# Patient Record
Sex: MALE | Race: ASIAN | NOT HISPANIC OR LATINO | Employment: OTHER | URBAN - METROPOLITAN AREA
[De-identification: names, ages, dates, MRNs, and addresses within clinical notes are randomized per-mention and may not be internally consistent; named-entity substitution may affect disease eponyms.]

---

## 2017-02-04 ENCOUNTER — APPOINTMENT (OUTPATIENT)
Dept: LAB | Age: 67
End: 2017-02-04
Payer: MEDICARE

## 2017-02-04 ENCOUNTER — TRANSCRIBE ORDERS (OUTPATIENT)
Dept: ADMINISTRATIVE | Age: 67
End: 2017-02-04

## 2017-02-04 DIAGNOSIS — Z12.5 ENCOUNTER FOR SCREENING FOR MALIGNANT NEOPLASM OF PROSTATE: ICD-10-CM

## 2017-02-04 DIAGNOSIS — E78.00 PURE HYPERCHOLESTEROLEMIA: ICD-10-CM

## 2017-02-04 DIAGNOSIS — I10 ESSENTIAL (PRIMARY) HYPERTENSION: ICD-10-CM

## 2017-02-04 DIAGNOSIS — D69.6 THROMBOCYTOPENIA (HCC): ICD-10-CM

## 2017-02-04 LAB
ALBUMIN SERPL BCP-MCNC: 4.4 G/DL (ref 3.5–5)
ALP SERPL-CCNC: 64 U/L (ref 46–116)
ALT SERPL W P-5'-P-CCNC: 39 U/L (ref 12–78)
ANION GAP SERPL CALCULATED.3IONS-SCNC: 5 MMOL/L (ref 4–13)
AST SERPL W P-5'-P-CCNC: 21 U/L (ref 5–45)
BILIRUB SERPL-MCNC: 1.11 MG/DL (ref 0.2–1)
BILIRUB UR QL STRIP: NEGATIVE
BUN SERPL-MCNC: 12 MG/DL (ref 5–25)
CALCIUM SERPL-MCNC: 9.1 MG/DL (ref 8.3–10.1)
CHLORIDE SERPL-SCNC: 102 MMOL/L (ref 100–108)
CHOLEST SERPL-MCNC: 125 MG/DL (ref 50–200)
CLARITY UR: CLEAR
CO2 SERPL-SCNC: 35 MMOL/L (ref 21–32)
COLOR UR: YELLOW
CREAT SERPL-MCNC: 1 MG/DL (ref 0.6–1.3)
ERYTHROCYTE [DISTWIDTH] IN BLOOD BY AUTOMATED COUNT: 13 % (ref 11.6–15.1)
GFR SERPL CREATININE-BSD FRML MDRD: >60 ML/MIN/1.73SQ M
GLUCOSE SERPL-MCNC: 92 MG/DL (ref 65–140)
GLUCOSE UR STRIP-MCNC: NEGATIVE MG/DL
HCT VFR BLD AUTO: 47.5 % (ref 36.5–49.3)
HDLC SERPL-MCNC: 40 MG/DL (ref 40–60)
HGB BLD-MCNC: 16.1 G/DL (ref 12–17)
HGB UR QL STRIP.AUTO: NEGATIVE
KETONES UR STRIP-MCNC: NEGATIVE MG/DL
LDLC SERPL CALC-MCNC: 56 MG/DL (ref 0–100)
LEUKOCYTE ESTERASE UR QL STRIP: NEGATIVE
MCH RBC QN AUTO: 29.3 PG (ref 26.8–34.3)
MCHC RBC AUTO-ENTMCNC: 33.9 G/DL (ref 31.4–37.4)
MCV RBC AUTO: 86 FL (ref 82–98)
NITRITE UR QL STRIP: NEGATIVE
PH UR STRIP.AUTO: 6.5 [PH] (ref 4.5–8)
PLATELET # BLD AUTO: 136 THOUSANDS/UL (ref 149–390)
PMV BLD AUTO: 12.1 FL (ref 8.9–12.7)
POTASSIUM SERPL-SCNC: 3.4 MMOL/L (ref 3.5–5.3)
PROT SERPL-MCNC: 8.4 G/DL (ref 6.4–8.2)
PROT UR STRIP-MCNC: NEGATIVE MG/DL
PSA SERPL-MCNC: 1.8 NG/ML (ref 0–4)
RBC # BLD AUTO: 5.5 MILLION/UL (ref 3.88–5.62)
SODIUM SERPL-SCNC: 142 MMOL/L (ref 136–145)
SP GR UR STRIP.AUTO: 1.01 (ref 1–1.03)
T3 SERPL-MCNC: 1 NG/ML (ref 0.6–1.8)
T4 FREE SERPL-MCNC: 1.04 NG/DL (ref 0.76–1.46)
TRIGL SERPL-MCNC: 145 MG/DL
TSH SERPL DL<=0.05 MIU/L-ACNC: 3.29 UIU/ML (ref 0.36–3.74)
UROBILINOGEN UR QL STRIP.AUTO: 0.2 E.U./DL
WBC # BLD AUTO: 5.37 THOUSAND/UL (ref 4.31–10.16)

## 2017-02-04 PROCEDURE — 81003 URINALYSIS AUTO W/O SCOPE: CPT

## 2017-02-04 PROCEDURE — 85027 COMPLETE CBC AUTOMATED: CPT

## 2017-02-04 PROCEDURE — 80053 COMPREHEN METABOLIC PANEL: CPT

## 2017-02-04 PROCEDURE — 80061 LIPID PANEL: CPT

## 2017-02-04 PROCEDURE — 84443 ASSAY THYROID STIM HORMONE: CPT

## 2017-02-04 PROCEDURE — 36415 COLL VENOUS BLD VENIPUNCTURE: CPT

## 2017-02-04 PROCEDURE — 84480 ASSAY TRIIODOTHYRONINE (T3): CPT

## 2017-02-04 PROCEDURE — G0103 PSA SCREENING: HCPCS

## 2017-02-04 PROCEDURE — 84439 ASSAY OF FREE THYROXINE: CPT

## 2017-02-15 ENCOUNTER — ALLSCRIPTS OFFICE VISIT (OUTPATIENT)
Dept: OTHER | Facility: OTHER | Age: 67
End: 2017-02-15

## 2017-04-17 DIAGNOSIS — E87.6 HYPOKALEMIA: ICD-10-CM

## 2017-04-17 DIAGNOSIS — I10 ESSENTIAL (PRIMARY) HYPERTENSION: ICD-10-CM

## 2017-04-22 ENCOUNTER — TRANSCRIBE ORDERS (OUTPATIENT)
Dept: ADMINISTRATIVE | Age: 67
End: 2017-04-22

## 2017-04-22 ENCOUNTER — APPOINTMENT (OUTPATIENT)
Dept: LAB | Age: 67
End: 2017-04-22
Payer: MEDICARE

## 2017-04-22 DIAGNOSIS — I10 ESSENTIAL (PRIMARY) HYPERTENSION: ICD-10-CM

## 2017-04-22 DIAGNOSIS — E87.6 HYPOKALEMIA: ICD-10-CM

## 2017-04-22 LAB
ANION GAP SERPL CALCULATED.3IONS-SCNC: 3 MMOL/L (ref 4–13)
BUN SERPL-MCNC: 11 MG/DL (ref 5–25)
CALCIUM SERPL-MCNC: 9.4 MG/DL (ref 8.3–10.1)
CHLORIDE SERPL-SCNC: 105 MMOL/L (ref 100–108)
CO2 SERPL-SCNC: 33 MMOL/L (ref 21–32)
CREAT SERPL-MCNC: 0.87 MG/DL (ref 0.6–1.3)
GFR SERPL CREATININE-BSD FRML MDRD: >60 ML/MIN/1.73SQ M
GLUCOSE P FAST SERPL-MCNC: 96 MG/DL (ref 65–99)
POTASSIUM SERPL-SCNC: 4.3 MMOL/L (ref 3.5–5.3)
SODIUM SERPL-SCNC: 141 MMOL/L (ref 136–145)

## 2017-04-22 PROCEDURE — 36415 COLL VENOUS BLD VENIPUNCTURE: CPT

## 2017-04-22 PROCEDURE — 80048 BASIC METABOLIC PNL TOTAL CA: CPT

## 2017-06-08 ENCOUNTER — ALLSCRIPTS OFFICE VISIT (OUTPATIENT)
Dept: OTHER | Facility: OTHER | Age: 67
End: 2017-06-08

## 2017-06-08 DIAGNOSIS — E78.00 PURE HYPERCHOLESTEROLEMIA: ICD-10-CM

## 2017-06-08 DIAGNOSIS — E87.6 HYPOKALEMIA: ICD-10-CM

## 2017-06-08 DIAGNOSIS — I10 ESSENTIAL (PRIMARY) HYPERTENSION: ICD-10-CM

## 2017-06-08 DIAGNOSIS — D69.6 THROMBOCYTOPENIA (HCC): ICD-10-CM

## 2017-06-08 DIAGNOSIS — R35.1 NOCTURIA: ICD-10-CM

## 2017-06-08 DIAGNOSIS — Z12.5 ENCOUNTER FOR SCREENING FOR MALIGNANT NEOPLASM OF PROSTATE: ICD-10-CM

## 2017-11-24 ENCOUNTER — ALLSCRIPTS OFFICE VISIT (OUTPATIENT)
Dept: OTHER | Facility: OTHER | Age: 67
End: 2017-11-24

## 2017-11-24 ENCOUNTER — GENERIC CONVERSION - ENCOUNTER (OUTPATIENT)
Dept: OTHER | Facility: OTHER | Age: 67
End: 2017-11-24

## 2017-11-24 DIAGNOSIS — Z00.00 ENCOUNTER FOR GENERAL ADULT MEDICAL EXAMINATION WITHOUT ABNORMAL FINDINGS: ICD-10-CM

## 2017-12-03 ENCOUNTER — TRANSCRIBE ORDERS (OUTPATIENT)
Dept: ADMINISTRATIVE | Age: 67
End: 2017-12-03

## 2017-12-03 ENCOUNTER — APPOINTMENT (OUTPATIENT)
Dept: LAB | Age: 67
End: 2017-12-03
Payer: MEDICARE

## 2017-12-03 DIAGNOSIS — R35.1 NOCTURIA: ICD-10-CM

## 2017-12-03 DIAGNOSIS — D69.6 ACQUIRED THROMBOCYTOPENIA (HCC): ICD-10-CM

## 2017-12-03 DIAGNOSIS — E78.00 PURE HYPERCHOLESTEROLEMIA: ICD-10-CM

## 2017-12-03 DIAGNOSIS — I10 ESSENTIAL HYPERTENSION, MALIGNANT: ICD-10-CM

## 2017-12-03 DIAGNOSIS — E87.6 HYPOPOTASSEMIA: ICD-10-CM

## 2017-12-03 DIAGNOSIS — I10 ESSENTIAL HYPERTENSION, MALIGNANT: Primary | ICD-10-CM

## 2017-12-03 LAB
ALBUMIN SERPL BCP-MCNC: 4.4 G/DL (ref 3.5–5)
ALP SERPL-CCNC: 87 U/L (ref 46–116)
ALT SERPL W P-5'-P-CCNC: 35 U/L (ref 12–78)
ANION GAP SERPL CALCULATED.3IONS-SCNC: 5 MMOL/L (ref 4–13)
AST SERPL W P-5'-P-CCNC: 22 U/L (ref 5–45)
BILIRUB SERPL-MCNC: 1.17 MG/DL (ref 0.2–1)
BUN SERPL-MCNC: 13 MG/DL (ref 5–25)
CALCIUM SERPL-MCNC: 9.4 MG/DL (ref 8.3–10.1)
CHLORIDE SERPL-SCNC: 106 MMOL/L (ref 100–108)
CHOLEST SERPL-MCNC: 133 MG/DL (ref 50–200)
CO2 SERPL-SCNC: 31 MMOL/L (ref 21–32)
CREAT SERPL-MCNC: 1.03 MG/DL (ref 0.6–1.3)
ERYTHROCYTE [DISTWIDTH] IN BLOOD BY AUTOMATED COUNT: 13 % (ref 11.6–15.1)
GFR SERPL CREATININE-BSD FRML MDRD: 76 ML/MIN/1.73SQ M
GLUCOSE P FAST SERPL-MCNC: 94 MG/DL (ref 65–99)
HCT VFR BLD AUTO: 46.8 % (ref 36.5–49.3)
HDLC SERPL-MCNC: 44 MG/DL (ref 40–60)
HGB BLD-MCNC: 16 G/DL (ref 12–17)
LDLC SERPL CALC-MCNC: 69 MG/DL (ref 0–100)
MCH RBC QN AUTO: 29.9 PG (ref 26.8–34.3)
MCHC RBC AUTO-ENTMCNC: 34.2 G/DL (ref 31.4–37.4)
MCV RBC AUTO: 87 FL (ref 82–98)
PLATELET # BLD AUTO: 133 THOUSANDS/UL (ref 149–390)
PMV BLD AUTO: 11.1 FL (ref 8.9–12.7)
POTASSIUM SERPL-SCNC: 4.1 MMOL/L (ref 3.5–5.3)
PROT SERPL-MCNC: 8.7 G/DL (ref 6.4–8.2)
PSA SERPL-MCNC: 2.3 NG/ML (ref 0–4)
RBC # BLD AUTO: 5.36 MILLION/UL (ref 3.88–5.62)
SODIUM SERPL-SCNC: 142 MMOL/L (ref 136–145)
TRIGL SERPL-MCNC: 98 MG/DL
WBC # BLD AUTO: 6.12 THOUSAND/UL (ref 4.31–10.16)

## 2017-12-03 PROCEDURE — 80061 LIPID PANEL: CPT

## 2017-12-03 PROCEDURE — 36415 COLL VENOUS BLD VENIPUNCTURE: CPT

## 2017-12-03 PROCEDURE — G0103 PSA SCREENING: HCPCS

## 2017-12-03 PROCEDURE — 80053 COMPREHEN METABOLIC PANEL: CPT

## 2017-12-03 PROCEDURE — 85027 COMPLETE CBC AUTOMATED: CPT

## 2017-12-07 ENCOUNTER — APPOINTMENT (OUTPATIENT)
Dept: LAB | Facility: CLINIC | Age: 67
End: 2017-12-07
Payer: MEDICARE

## 2017-12-07 DIAGNOSIS — Z00.00 ENCOUNTER FOR GENERAL ADULT MEDICAL EXAMINATION WITHOUT ABNORMAL FINDINGS: ICD-10-CM

## 2017-12-07 LAB — HEMOCCULT STL QL IA: NEGATIVE

## 2017-12-07 PROCEDURE — G0328 FECAL BLOOD SCRN IMMUNOASSAY: HCPCS

## 2017-12-13 ENCOUNTER — GENERIC CONVERSION - ENCOUNTER (OUTPATIENT)
Dept: OTHER | Facility: OTHER | Age: 67
End: 2017-12-13

## 2018-01-12 VITALS
DIASTOLIC BLOOD PRESSURE: 86 MMHG | BODY MASS INDEX: 26.35 KG/M2 | HEART RATE: 61 BPM | OXYGEN SATURATION: 98 % | TEMPERATURE: 98.1 F | WEIGHT: 158.13 LBS | SYSTOLIC BLOOD PRESSURE: 130 MMHG | HEIGHT: 65 IN

## 2018-01-13 VITALS
DIASTOLIC BLOOD PRESSURE: 78 MMHG | HEIGHT: 64 IN | WEIGHT: 155 LBS | TEMPERATURE: 97.8 F | HEART RATE: 62 BPM | SYSTOLIC BLOOD PRESSURE: 136 MMHG | BODY MASS INDEX: 26.46 KG/M2 | OXYGEN SATURATION: 97 %

## 2018-01-14 VITALS
HEART RATE: 67 BPM | SYSTOLIC BLOOD PRESSURE: 130 MMHG | OXYGEN SATURATION: 95 % | BODY MASS INDEX: 26.39 KG/M2 | DIASTOLIC BLOOD PRESSURE: 78 MMHG | WEIGHT: 158.38 LBS | TEMPERATURE: 98.3 F | HEIGHT: 65 IN

## 2018-01-23 NOTE — PROGRESS NOTES
Assessment   1  Benign essential hypertension (401 1) (I10)  2  Hypercholesterolemia (272 0) (E78 00)  3  Thrombocytopenia (287 5) (D69 6)  4  Nocturia (788 43) (R35 1)  5  Encounter for preventive health examination (V70 0) (Z00 00)    Plan  Health Maintenance    · (1) OCCULT BLOOD, FECAL IMMUNOCHEMICAL TEST; Status:Active; Requested  for:24Nov2017;    · EKG/ECG- POC; Status:Complete - Retrospective By Protocol Authorization;   Done:  79DQD1798 11:29AM   · Medicare Annual Wellness Visit; Status:Complete - Retrospective By Protocol  Authorization;   Done: 39CWL9211 11:17AM   · SNELLEN VISION- POC; Status:Complete - Retrospective By Protocol Authorization;    Done: 01RWX3428 11:30AM   · Medicare Annual Wellness Visit ; every 1 year; Last 32MHT0053; Next 43ZXA7808;  Status:Active  Negative depression screening, Screening for depression    · *VB-Depression Screening; Status:Complete - Retrospective By Protocol Authorization;    Done: 52DGB8404 11:19AM   · *VB-Depression Screening ; every 1 year; Last 31IIB2176; Next 67GZD5090;  Status:Active   · PHQ-2 Adult Depression Screening ; every 1 year; Last 51FNY8243; Next 90PFU5189;  Status:Active  PMH: Screening for genitourinary condition    · *VB - Urinary Incontinence Screen (Dx Z13 89 Screen for UI) ; every 1 year; Last  15QGX6226; Next 74VEG7625; Status:Active   · Falls Risk Assessment (Dx Z13 89 Screen for Neurologic Disorder) ; every 1 year; Last  85HUQ2464; Next 75QCZ9111; Status:Active  Screening for genitourinary condition    · *VB - Urinary Incontinence Screen (Dx Z13 89 Screen for UI); Status:Complete -  Retrospective By Protocol Authorization;   Done: 19VOG6639 11:18AM  Screening for neurological condition    · *VB - Fall Risk Assessment  (Dx Z13 89 Screen for Neurologic Disorder);  Status:Complete - Retrospective Authorization;   Done: 57PUH7785 11:17AM   · *VB - Fall Risk Assessment  (Dx Z13 89 Screen for Neurologic Disorder) ; every 1 year;   Last 76NTO1741; Next [id number]; Status:Active    Discussion/Summary    No changes were made to current medications  Screening blood work and fecal immunochemical testing scheduled for early December  We will follow up with appropriate assessments at that time  Chief Complaint  PT IS BEING SEEN FOR HIS WELCOME TO MEDICARE WELCOME VISIT  History of Present Illness  Welcome to Medicare and Wellness Visits: The patient is being seen for the welcome to medicare visit  Medicare Screening and Risk Factors   Hospitalizations: no previous hospitalizations  Once per lifetime medicare screening tests: ECG and AAA screening US has not yet been done  Medicare Screening Tests Risk Questions   Abdominal aortic aneurysm risk assessment: over 72years of age, but none indicated  Osteoporosis risk assessment:  descent, over 48years of age and alcohol use  HIV risk assessment: none indicated  Drug and Alcohol Use: The patient has never smoked cigarettes and has never used smokeless tobacco  The patient reports occasional alcohol use  Alcohol concern:   The patient has no concerns about alcohol abuse  He has never used illicit drugs  Diet and Physical Activity: Current diet includes well balanced meals, limited junk food, 1 servings of fruit per day, 1 servings of vegetables per day, 1 servings of meat per day, 1 servings of whole grains per day, 1 servings of simple carbohydrates per day, 1 servings of dairy products per day, 1 cups of coffee per day and 1 cups of tea per day  He exercises daily and exercises 5 times per week  Exercise: walking, stretching 60 minutes per day  Mood Disorder and Cognitive Impairment Screening:   Depression screening  negative for symptoms  He denies feeling down, depressed, or hopeless over the past two weeks  He denies feeling little interest or pleasure in doing things over the past two weeks     Cognitive impairment screening: denies difficulty learning/retaining new information, denies difficulty handling complex tasks, denies difficulty with reasoning, denies difficulty with spatial ability and orientation, denies difficulty with language and denies difficulty with behavior  Functional Ability/Level of Safety: Hearing is normal bilaterally, normal in the right ear and normal in the left ear  He denies hearing difficulties  He does not use a hearing aid  The patient is currently able to do activities of daily living with limitations, able to do instrumental activities of daily living with limitations and able to participate in social activities with limitations, but able to drive without limitations  Activities of daily living details: does not need help using the phone, no transportation help needed, does not need help shopping, no meal preparation help needed, does not need help doing housework, does not need help doing laundry, does not need help managing medications and does not need help managing money  Fall risk factors: The patient fell 0 times in the past 12 months  Injury History: polypharmacy, alcohol use, no mobility impairment, no antidepressant use, no deconditioning, no postural hypotension, no sedative use, no visual impairment, no urinary incontinence, antihypertensive use, no cognitive impairment, up and go test was normal and no previous fall  Home safety risk factors:  no grab bars in the bathroom, but no unfamiliar surroundings, no loose rugs, no poor household lighting, no uneven floors and no household clutter  Advance Directives: Advance directives: living will, but no durable power of  for health care directives and no advance directives  Co-Managers and Medical Equipment/Suppliers: See Patient Care Team   Preventive Quality Program 65 and Older: Falls Risk: The patient fell 0 times in the past 12 months  The patient currently has no urinary incontinence symptoms         Patient Care Team    Care Team Member Role Specialty Office Number   [facility], Kyle HIGGINS  Gastroenterology Adult [phone]   Kulwinder Caceres MD  Internal Medicine [phone]     Review of Systems    Constitutional: negative  Head and Face: negative  Eyes: negative  ENT: negative  Cardiovascular: negative  Respiratory: negative  Gastrointestinal: negative  Genitourinary: negative    nocturia  Musculoskeletal: negative  Integumentary and Breasts: negative  Neurological: negative  Psychiatric: negative  Endocrine: negative  Hematologic and Lymphatic: negative  Over the past 2 weeks, how often have you been bothered by the following problems? 1 ) Little interest or pleasure in doing things? Not at all    2 ) Feeling down, depressed or hopeless? Not at all    3 ) Trouble falling asleep or sleeping too much? Not at all    4 ) Feeling tired or having little energy? Not at all    5 ) Poor appetite or overeating? Not at all    6 ) Feeling bad about yourself, or that you are a failure, or have let yourself or your family down? Not at all    7 ) Trouble concentrating on things, such as reading a newspaper or watching television? Not at all    8 ) Moving or speaking so slowly that other people could have noticed, or the opposite, moving or speaking faster than usual? Not at all    9 ) Thoughts that you would be better off dead or of hurting yourself in some way? Not at all  Score 0      Active Problems   1  Benign essential hypertension (401 1) (I10)  2  Hypercholesterolemia (272 0) (E78 00)  3  Hypokalemia (276 8) (E87 6)  4  Nocturia (788 43) (R35 1)  5  Thrombocytopenia (287 5) (D69 6)    Past Medical History    · History of hypertension (V12 59) (Z86 79)   · History of urinary frequency (V13 09) (J15 291)    The active problems and past medical history were reviewed and updated today  Surgical History    · History of Hernia Repair    The surgical history was reviewed and updated today         Family History  Mother    · Family history of Benign essential HTN   · Denied: Family history of Colon cancer   · Denied: Family history of Crohn disease   · Denied: Family history of Liver disease  Father    · Denied: Family history of Colon cancer   · Denied: Family history of Crohn disease   · Denied: Family history of Liver disease    The family history was reviewed and updated today  Social History    · Never a smoker   · No alcohol use   · No illicit drug use  The social history was reviewed and updated today  The social history was reviewed and is unchanged  Current Meds  1  AmLODIPine Besylate 5 MG Oral Tablet; Take 1 tablet daily  Requested for: 77AAE8355;   Last Rx:08Jun2017 Ordered  2  Metoprolol Succinate ER 25 MG Oral Tablet Extended Release 24 Hour; Take 1 tablet   daily; Therapy: 21TMC4882 to (Last Rx:08Jun2017)  Requested for: 67OIC5734 Ordered  3  Metoprolol-Hydrochlorothiazide 50-25 MG Oral Tablet; TAKE 1 TABLET DAILY; Therapy: 98PLM0611 to (Evaluate:14Jan2017)  Requested for: 64ZPN2905; Last   Rx:84Skj5358; Status: ACTIVE - Renewal Voided Ordered  4  Simvastatin 20 MG Oral Tablet; Take 1 tablet daily  Requested for: 13OKK6360; Last   Rx:08Jun2017 Ordered  5  Terazosin HCl - 2 MG Oral Capsule; TAKE 1 CAPSULE Bedtime; Therapy: 62XYU4339 to (Evaluate:39Nbq5231)  Requested for: 53NYP5173; Last   Rx:18Naf7625 Ordered    The medication list was reviewed and updated today  Allergies   1  No Known Drug Allergies   2  No Known Environmental Allergies  3  No Known Food Allergies    Immunizations   1 2 3 4    Influenza  15-Oct-2014 15-Oct-2015 27-Oct-2016 11-Oct-2017     Vitals  Signs    Temperature: 97 8 F, Oral  Heart Rate: 62  Systolic: 124, LUE, Sitting  Diastolic: 78, LUE, Sitting  Height: 5 ft 3 5 in  Weight: 155 lb   BMI Calculated: 27 03  BSA Calculated: 1 75  O2 Saturation: 97    Physical Exam    Constitutional   General appearance: No acute distress, well appearing and well nourished      Head and Face   Head and face: Normal  Palpation of the face and sinuses: No sinus tenderness  Eyes   Conjunctiva and lids: No erythema, swelling or discharge  Pupils and irises: Equal, round, reactive to light  Ears, Nose, Mouth, and Throat   External inspection of ears and nose: Normal     Otoscopic examination: Tympanic membranes translucent with normal light reflex  Canals patent without erythema  Nasal mucosa, septum, and turbinates: Normal without edema or erythema  Lips, teeth, and gums: Normal, good dentition  Oropharynx: Normal with no erythema, edema, exudate or lesions  Neck   Neck: Supple, symmetric, trachea midline, no masses  Thyroid: Normal, no thyromegaly  Pulmonary   Respiratory effort: No increased work of breathing or signs of respiratory distress  Percussion of chest: Normal     Palpation of chest: Normal     Auscultation of lungs: Clear to auscultation  Cardiovascular   Palpation of heart: Normal PMI, no thrills  Auscultation of heart: Normal rate and rhythm, normal S1 and S2, no murmurs  Carotid pulses: 2+ bilaterally  Abdominal aorta: Normal     Femoral pulses: 2+ bilaterally  Pedal pulses: 2+ bilaterally  Peripheral vascular exam: Normal     Examination of extremities for edema and/or varicosities: Normal     Chest   Breasts: Normal, no dimpling or skin changes appreciated  Palpation of breasts and axillae: Normal, no masses palpated  Chest: Normal     Abdomen   Abdomen: Non-tender, no masses  Liver and spleen: No hepatomegaly or splenomegaly  Examination for hernias: No hernias appreciated  Anus, perineum, and rectum: Normal sphincter tone, no masses, no prolapse  Ordered FIT  Genitourinary   Scrotal contents: Normal testes, no masses  Penis: Normal, no lesions  Digital rectal exam of prostate: Normal size, no masses  Lymphatic   Palpation of lymph nodes in neck: No lymphadenopathy  Palpation of lymph nodes in axillae: No lymphadenopathy      Palpation of lymph nodes in groin: No lymphadenopathy  Palpation of lymph nodes in other areas: No lymphadenopathy  Musculoskeletal   Gait and station: Normal     Inspection/palpation of digits and nails: Normal without clubbing or cyanosis  Inspection/palpation of joints, bones, and muscles: Normal     Range of motion: Normal     Stability: Normal     Muscle strength/tone: Normal     Skin   Skin and subcutaneous tissue: Normal without rashes or lesions  Palpation of skin and subcutaneous tissue: Normal turgor  Neurologic   Cranial nerves: Cranial nerves 2-12 intact  Reflexes: 2+ and symmetric  Sensation: No sensory loss  Psychiatric   Orientation to person, place and time: Normal     Mood and affect: Normal        Results/Data  SNELLEN VISION- POC 03MYB9266 11:30AM Norlin Hernandez     Test Name Result Flag Reference   Right Eye 20/20     Left Eye 20/13       EKG/ECG- POC 14HDN0890 11:29AM Norlin Hernandez     Test Name Result Flag Reference   EKG/ECG 11/24/2017       *VB-Depression Screening 62SCV9593 11:19AM Norlin Hernandez     Test Name Result Flag Reference   Depression Scale Result      Depression Screen - Negative For Symptoms     *VB - Urinary Incontinence Screen (Dx Z13 89 Screen for UI) 99IWF6755 11:18AM Norlin Hernandez     Test Name Result Flag Reference   Urinary Incontinence Assessment 13VKG1842       Medicare Annual Wellness Visit 80MXT8733 11:17AM Norlin Hernandez     Test Name Result Flag Reference   MEDICARE [place] VISIT [id number]       *VB - Fall Risk Assessment  (Dx Z13 89 Screen for Neurologic Disorder) 03AXE9856 11:17AM Norlin Hernandez     Test Name Result Flag Reference   Falls Risk      No falls in the past year     A 12 lead ECG was performed and was normal  No acute ischemia  Rhythm and rate: the atrial rate is 53 beats per minute and ventricular rate is 53 beats per minute, but normal sinus rhythm     P-waves: NE interval is normal, but the P wave is normal  ND Interval: 188 seconds  Axis: the QRS axis is normal    QRS: QRS interval: 96 seconds, but the QRS is normal    ST segment: QTc interval: 418 and the ST segments are normal    T waves: normal    Comparison to prior ECGs:  no prior ECGs were available for comparison  Procedure    Procedure:   Inforrmation supplied by a Snellen chart  Results: 20/20 in the right eye with corrective device, 20/13 in the left eye with corrective device      Health Management  History of Colon cancer screening   COLONOSCOPY; every 10 years; Last 11COG9271; Next Due: 90CBO0489; Active  History of Screening for genitourinary condition   *VB - Urinary Incontinence Screen (Dx Z13 89 Screen for UI); every 1 year; Last  34DMC1468; Next Due: 35ASH5774; Active  Falls Risk Assessment (Dx Z13 89 Screen for Neurologic Disorder); every 1 year; Last  74VPN2073; Next Due: 52QEB9923; Active  Negative depression screening   PHevery-2 Adult Depression Screening; every 1 year; Last 04TDF5073; Next Due:  75VUR9753; Active  Screening for depression   PHevery-2 Adult Depression Screening; every 1 year; Last 84QBJ1280; Next Due:  20BAT2649;  Active    Future Appointments    Date/Time Provider Specialty Site   12/13/2017 11:15 AM [place] [facility]     Signatures   Electronically signed by : Shelia Zhou MD; Nov 26 2017 11:00AM EST                       (Author)

## 2018-01-24 VITALS
HEART RATE: 69 BPM | OXYGEN SATURATION: 96 % | BODY MASS INDEX: 27 KG/M2 | SYSTOLIC BLOOD PRESSURE: 132 MMHG | DIASTOLIC BLOOD PRESSURE: 90 MMHG | WEIGHT: 158.13 LBS | TEMPERATURE: 98.3 F | HEIGHT: 64 IN

## 2018-01-24 VITALS — DIASTOLIC BLOOD PRESSURE: 82 MMHG | SYSTOLIC BLOOD PRESSURE: 142 MMHG

## 2018-04-12 DIAGNOSIS — I10 BENIGN HYPERTENSION: Primary | ICD-10-CM

## 2018-04-12 DIAGNOSIS — I10 BENIGN HYPERTENSION: ICD-10-CM

## 2018-04-12 DIAGNOSIS — E78.00 HYPERCHOLESTEROLEMIA: ICD-10-CM

## 2018-04-12 RX ORDER — AMLODIPINE BESYLATE 5 MG/1
5 TABLET ORAL DAILY
Qty: 90 TABLET | Refills: 1 | Status: SHIPPED | OUTPATIENT
Start: 2018-04-12 | End: 2018-04-12 | Stop reason: SDUPTHER

## 2018-04-12 RX ORDER — SIMVASTATIN 20 MG
1 TABLET ORAL DAILY
COMMUNITY
End: 2018-04-12 | Stop reason: SDUPTHER

## 2018-04-12 RX ORDER — METOPROLOL SUCCINATE 25 MG/1
25 TABLET, EXTENDED RELEASE ORAL DAILY
Qty: 90 TABLET | Refills: 1 | Status: SHIPPED | OUTPATIENT
Start: 2018-04-12 | End: 2018-12-14 | Stop reason: SDUPTHER

## 2018-04-12 RX ORDER — METOPROLOL TARTRATE AND HYDROCHLOROTHIAZIDE 50; 25 MG/1; MG/1
1 TABLET ORAL DAILY
COMMUNITY
Start: 2014-12-29 | End: 2018-07-23 | Stop reason: SDUPTHER

## 2018-04-12 RX ORDER — AMLODIPINE BESYLATE 5 MG/1
1 TABLET ORAL DAILY
COMMUNITY
End: 2018-04-12 | Stop reason: SDUPTHER

## 2018-04-12 RX ORDER — METOPROLOL SUCCINATE 25 MG/1
1 TABLET, EXTENDED RELEASE ORAL DAILY
COMMUNITY
Start: 2017-02-15 | End: 2018-04-12 | Stop reason: SDUPTHER

## 2018-04-12 RX ORDER — TERAZOSIN 2 MG/1
1 CAPSULE ORAL
COMMUNITY
Start: 2017-02-15 | End: 2018-07-23 | Stop reason: SDUPTHER

## 2018-04-12 RX ORDER — AMLODIPINE BESYLATE 5 MG/1
5 TABLET ORAL DAILY
Qty: 90 TABLET | Refills: 1 | Status: SHIPPED | OUTPATIENT
Start: 2018-04-12 | End: 2018-07-23 | Stop reason: SDUPTHER

## 2018-04-12 RX ORDER — SIMVASTATIN 20 MG
20 TABLET ORAL DAILY
Qty: 90 TABLET | Refills: 1 | Status: SHIPPED | OUTPATIENT
Start: 2018-04-12 | End: 2018-07-23 | Stop reason: SDUPTHER

## 2018-05-29 DIAGNOSIS — I10 ESSENTIAL (PRIMARY) HYPERTENSION: ICD-10-CM

## 2018-05-29 DIAGNOSIS — D69.6 THROMBOCYTOPENIA (HCC): ICD-10-CM

## 2018-05-29 DIAGNOSIS — E78.00 PURE HYPERCHOLESTEROLEMIA: ICD-10-CM

## 2018-06-09 ENCOUNTER — APPOINTMENT (OUTPATIENT)
Dept: LAB | Age: 68
End: 2018-06-09
Payer: MEDICARE

## 2018-06-09 DIAGNOSIS — E78.00 PURE HYPERCHOLESTEROLEMIA: ICD-10-CM

## 2018-06-09 DIAGNOSIS — I10 ESSENTIAL (PRIMARY) HYPERTENSION: ICD-10-CM

## 2018-06-09 DIAGNOSIS — D69.6 THROMBOCYTOPENIA (HCC): ICD-10-CM

## 2018-06-09 LAB
ALBUMIN SERPL BCP-MCNC: 4.3 G/DL (ref 3.5–5)
ALP SERPL-CCNC: 73 U/L (ref 46–116)
ALT SERPL W P-5'-P-CCNC: 36 U/L (ref 12–78)
ANION GAP SERPL CALCULATED.3IONS-SCNC: 2 MMOL/L (ref 4–13)
AST SERPL W P-5'-P-CCNC: 22 U/L (ref 5–45)
BILIRUB SERPL-MCNC: 1.07 MG/DL (ref 0.2–1)
BUN SERPL-MCNC: 19 MG/DL (ref 5–25)
CALCIUM SERPL-MCNC: 9.1 MG/DL (ref 8.3–10.1)
CHLORIDE SERPL-SCNC: 106 MMOL/L (ref 100–108)
CO2 SERPL-SCNC: 32 MMOL/L (ref 21–32)
CREAT SERPL-MCNC: 1.09 MG/DL (ref 0.6–1.3)
ERYTHROCYTE [DISTWIDTH] IN BLOOD BY AUTOMATED COUNT: 13 % (ref 11.6–15.1)
GFR SERPL CREATININE-BSD FRML MDRD: 70 ML/MIN/1.73SQ M
GLUCOSE P FAST SERPL-MCNC: 84 MG/DL (ref 65–99)
HCT VFR BLD AUTO: 46.4 % (ref 36.5–49.3)
HGB BLD-MCNC: 15.5 G/DL (ref 12–17)
MCH RBC QN AUTO: 29.4 PG (ref 26.8–34.3)
MCHC RBC AUTO-ENTMCNC: 33.4 G/DL (ref 31.4–37.4)
MCV RBC AUTO: 88 FL (ref 82–98)
PLATELET # BLD AUTO: 143 THOUSANDS/UL (ref 149–390)
PMV BLD AUTO: 11.6 FL (ref 8.9–12.7)
POTASSIUM SERPL-SCNC: 4.9 MMOL/L (ref 3.5–5.3)
PROT SERPL-MCNC: 8.1 G/DL (ref 6.4–8.2)
RBC # BLD AUTO: 5.28 MILLION/UL (ref 3.88–5.62)
SODIUM SERPL-SCNC: 140 MMOL/L (ref 136–145)
WBC # BLD AUTO: 4.62 THOUSAND/UL (ref 4.31–10.16)

## 2018-06-09 PROCEDURE — 85027 COMPLETE CBC AUTOMATED: CPT

## 2018-06-09 PROCEDURE — 36415 COLL VENOUS BLD VENIPUNCTURE: CPT

## 2018-06-09 PROCEDURE — 80053 COMPREHEN METABOLIC PANEL: CPT

## 2018-07-23 ENCOUNTER — OFFICE VISIT (OUTPATIENT)
Dept: INTERNAL MEDICINE CLINIC | Facility: CLINIC | Age: 68
End: 2018-07-23
Payer: MEDICARE

## 2018-07-23 VITALS
HEIGHT: 64 IN | WEIGHT: 156.8 LBS | SYSTOLIC BLOOD PRESSURE: 110 MMHG | HEART RATE: 70 BPM | TEMPERATURE: 98 F | OXYGEN SATURATION: 98 % | BODY MASS INDEX: 26.77 KG/M2 | DIASTOLIC BLOOD PRESSURE: 72 MMHG

## 2018-07-23 DIAGNOSIS — E78.00 HYPERCHOLESTEROLEMIA: ICD-10-CM

## 2018-07-23 DIAGNOSIS — Z12.5 SCREENING FOR PROSTATE CANCER: ICD-10-CM

## 2018-07-23 DIAGNOSIS — I10 BENIGN ESSENTIAL HYPERTENSION: Primary | ICD-10-CM

## 2018-07-23 DIAGNOSIS — I10 BENIGN HYPERTENSION: ICD-10-CM

## 2018-07-23 PROBLEM — R35.1 NOCTURIA: Status: ACTIVE | Noted: 2017-02-15

## 2018-07-23 PROCEDURE — 99213 OFFICE O/P EST LOW 20 MIN: CPT | Performed by: INTERNAL MEDICINE

## 2018-07-23 RX ORDER — AMLODIPINE BESYLATE 5 MG/1
5 TABLET ORAL DAILY
Qty: 90 TABLET | Refills: 2 | Status: SHIPPED | OUTPATIENT
Start: 2018-07-23 | End: 2018-12-14 | Stop reason: SDUPTHER

## 2018-07-23 RX ORDER — METOPROLOL TARTRATE AND HYDROCHLOROTHIAZIDE 50; 25 MG/1; MG/1
1 TABLET ORAL DAILY
Qty: 90 TABLET | Refills: 1 | Status: SHIPPED | OUTPATIENT
Start: 2018-07-23 | End: 2018-12-14 | Stop reason: SDUPTHER

## 2018-07-23 RX ORDER — TERAZOSIN 2 MG/1
2 CAPSULE ORAL DAILY
Qty: 90 CAPSULE | Refills: 1 | Status: SHIPPED | OUTPATIENT
Start: 2018-07-23 | End: 2018-12-14 | Stop reason: SDUPTHER

## 2018-07-23 RX ORDER — SIMVASTATIN 20 MG
20 TABLET ORAL DAILY
Qty: 90 TABLET | Refills: 1 | Status: SHIPPED | OUTPATIENT
Start: 2018-07-23 | End: 2018-12-14 | Stop reason: SDUPTHER

## 2018-07-23 NOTE — PROGRESS NOTES
Assessment/Plan:    Benign essential hypertension    Blood pressure is well controlled on current medications  Hypercholesterolemia    Doing well on current medications  No changes necessary  Screening for prostate cancer   Medicare is denying payment for PSA for reasons unknown       Diagnoses and all orders for this visit:    Benign essential hypertension  -     terazosin (HYTRIN) 2 mg capsule; Take 1 capsule (2 mg total) by mouth daily  -     CBC and Platelet; Future  -     Cancel: Comprehensive metabolic panel; Future  -     Lipid panel; Future    Benign hypertension  -     amLODIPine (NORVASC) 5 mg tablet; Take 1 tablet (5 mg total) by mouth daily  -     metoprolol-hydrochlorothiazide (LOPRESSOR HCT) 50-25 MG per tablet; Take 1 tablet by mouth daily  -     CBC and Platelet; Future  -     Cancel: Comprehensive metabolic panel; Future  -     Lipid panel; Future    Hypercholesterolemia  -     simvastatin (ZOCOR) 20 mg tablet; Take 1 tablet (20 mg total) by mouth daily  -     terazosin (HYTRIN) 2 mg capsule; Take 1 capsule (2 mg total) by mouth daily  -     CBC and Platelet; Future  -     Cancel: Comprehensive metabolic panel; Future  -     Lipid panel; Future    Screening for prostate cancer  -     PSA Total, Diagnostic; Future          Subjective:      Patient ID: Quang Mann is a 78 y o  male  HPI    Family History   Problem Relation Age of Onset    Hypertension Mother         benign essential     Social History     Social History    Marital status: /Civil Union     Spouse name: N/A    Number of children: N/A    Years of education: N/A     Occupational History    Not on file  Social History Main Topics    Smoking status: Never Smoker    Smokeless tobacco: Never Used    Alcohol use No    Drug use: No    Sexual activity: Not on file     Other Topics Concern    Not on file     Social History Narrative    No narrative on file     History reviewed   No pertinent past medical history  Current Outpatient Prescriptions:     amLODIPine (NORVASC) 5 mg tablet, Take 1 tablet (5 mg total) by mouth daily, Disp: 90 tablet, Rfl: 2    metoprolol-hydrochlorothiazide (LOPRESSOR HCT) 50-25 MG per tablet, Take 1 tablet by mouth daily, Disp: 90 tablet, Rfl: 1    simvastatin (ZOCOR) 20 mg tablet, Take 1 tablet (20 mg total) by mouth daily, Disp: 90 tablet, Rfl: 1    terazosin (HYTRIN) 2 mg capsule, Take 1 capsule (2 mg total) by mouth daily, Disp: 90 capsule, Rfl: 1    metoprolol succinate (TOPROL-XL) 25 mg 24 hr tablet, Take 1 tablet (25 mg total) by mouth daily, Disp: 90 tablet, Rfl: 1  Allergies no known allergies  Past Surgical History:   Procedure Laterality Date    HERNIA REPAIR           Review of Systems      Objective:      /72 (BP Location: Left arm, Patient Position: Sitting, Cuff Size: Standard)   Pulse 70   Temp 98 °F (36 7 °C) (Oral)   Ht 5' 4" (1 626 m)   Wt 71 1 kg (156 lb 12 8 oz)   SpO2 98%   BMI 26 91 kg/m²          Physical Exam   Constitutional: He is oriented to person, place, and time  He appears well-developed  HENT:   Head: Atraumatic  Eyes: Conjunctivae and EOM are normal    Neck: Neck supple  No JVD present  No tracheal deviation present  Cardiovascular: Normal rate, regular rhythm, normal heart sounds and intact distal pulses  No murmur heard  Pulmonary/Chest: Effort normal and breath sounds normal  He has no wheezes  He has no rales  Abdominal: Soft  Bowel sounds are normal  He exhibits no distension  There is no tenderness  Musculoskeletal: Normal range of motion  He exhibits no edema  Lymphadenopathy:     He has no cervical adenopathy  Neurological: He is alert and oriented to person, place, and time  No cranial nerve deficit  Coordination normal    No focal motor deficits are appreciated  Skin: Skin is warm and dry  Psychiatric: He has a normal mood and affect  Thought content normal    Vitals reviewed

## 2018-12-08 ENCOUNTER — APPOINTMENT (OUTPATIENT)
Dept: LAB | Age: 68
End: 2018-12-08
Payer: MEDICARE

## 2018-12-08 ENCOUNTER — TRANSCRIBE ORDERS (OUTPATIENT)
Dept: ADMINISTRATIVE | Age: 68
End: 2018-12-08

## 2018-12-08 DIAGNOSIS — I10 BENIGN HYPERTENSION: ICD-10-CM

## 2018-12-08 DIAGNOSIS — E78.00 HYPERCHOLESTEROLEMIA: ICD-10-CM

## 2018-12-08 DIAGNOSIS — E78.00 PURE HYPERCHOLESTEROLEMIA: Primary | ICD-10-CM

## 2018-12-08 DIAGNOSIS — E78.00 PURE HYPERCHOLESTEROLEMIA: ICD-10-CM

## 2018-12-08 DIAGNOSIS — Z12.5 SCREENING FOR PROSTATE CANCER: ICD-10-CM

## 2018-12-08 DIAGNOSIS — I10 BENIGN ESSENTIAL HYPERTENSION: ICD-10-CM

## 2018-12-08 LAB
ALBUMIN SERPL BCP-MCNC: 4.4 G/DL (ref 3.5–5)
ALP SERPL-CCNC: 74 U/L (ref 46–116)
ALT SERPL W P-5'-P-CCNC: 39 U/L (ref 12–78)
ANION GAP SERPL CALCULATED.3IONS-SCNC: 5 MMOL/L (ref 4–13)
AST SERPL W P-5'-P-CCNC: 26 U/L (ref 5–45)
BILIRUB SERPL-MCNC: 1.12 MG/DL (ref 0.2–1)
BUN SERPL-MCNC: 15 MG/DL (ref 5–25)
CALCIUM SERPL-MCNC: 9.7 MG/DL (ref 8.3–10.1)
CHLORIDE SERPL-SCNC: 103 MMOL/L (ref 100–108)
CHOLEST SERPL-MCNC: 126 MG/DL (ref 50–200)
CO2 SERPL-SCNC: 31 MMOL/L (ref 21–32)
CREAT SERPL-MCNC: 0.94 MG/DL (ref 0.6–1.3)
ERYTHROCYTE [DISTWIDTH] IN BLOOD BY AUTOMATED COUNT: 13 % (ref 11.6–15.1)
GFR SERPL CREATININE-BSD FRML MDRD: 84 ML/MIN/1.73SQ M
GLUCOSE P FAST SERPL-MCNC: 89 MG/DL (ref 65–99)
HCT VFR BLD AUTO: 49.1 % (ref 36.5–49.3)
HDLC SERPL-MCNC: 36 MG/DL (ref 40–60)
HGB BLD-MCNC: 16.3 G/DL (ref 12–17)
LDLC SERPL CALC-MCNC: 65 MG/DL (ref 0–100)
MCH RBC QN AUTO: 29.3 PG (ref 26.8–34.3)
MCHC RBC AUTO-ENTMCNC: 33.2 G/DL (ref 31.4–37.4)
MCV RBC AUTO: 88 FL (ref 82–98)
NONHDLC SERPL-MCNC: 90 MG/DL
PLATELET # BLD AUTO: 141 THOUSANDS/UL (ref 149–390)
PMV BLD AUTO: 11.4 FL (ref 8.9–12.7)
POTASSIUM SERPL-SCNC: 4.1 MMOL/L (ref 3.5–5.3)
PROT SERPL-MCNC: 8.2 G/DL (ref 6.4–8.2)
PSA SERPL-MCNC: 2.3 NG/ML (ref 0–4)
RBC # BLD AUTO: 5.56 MILLION/UL (ref 3.88–5.62)
SODIUM SERPL-SCNC: 139 MMOL/L (ref 136–145)
TRIGL SERPL-MCNC: 127 MG/DL
WBC # BLD AUTO: 4.33 THOUSAND/UL (ref 4.31–10.16)

## 2018-12-08 PROCEDURE — 85027 COMPLETE CBC AUTOMATED: CPT

## 2018-12-08 PROCEDURE — G0103 PSA SCREENING: HCPCS

## 2018-12-08 PROCEDURE — 36415 COLL VENOUS BLD VENIPUNCTURE: CPT

## 2018-12-08 PROCEDURE — 80053 COMPREHEN METABOLIC PANEL: CPT

## 2018-12-08 PROCEDURE — 80061 LIPID PANEL: CPT

## 2018-12-14 ENCOUNTER — OFFICE VISIT (OUTPATIENT)
Dept: INTERNAL MEDICINE CLINIC | Facility: CLINIC | Age: 68
End: 2018-12-14
Payer: MEDICARE

## 2018-12-14 VITALS
HEART RATE: 65 BPM | DIASTOLIC BLOOD PRESSURE: 68 MMHG | WEIGHT: 156.4 LBS | HEIGHT: 61 IN | SYSTOLIC BLOOD PRESSURE: 122 MMHG | BODY MASS INDEX: 29.53 KG/M2 | OXYGEN SATURATION: 98 % | TEMPERATURE: 97.8 F

## 2018-12-14 DIAGNOSIS — I10 BENIGN ESSENTIAL HYPERTENSION: Primary | ICD-10-CM

## 2018-12-14 DIAGNOSIS — R35.1 NOCTURIA: ICD-10-CM

## 2018-12-14 DIAGNOSIS — D69.6 THROMBOCYTOPENIA (HCC): ICD-10-CM

## 2018-12-14 DIAGNOSIS — Z12.5 SCREENING FOR PROSTATE CANCER: ICD-10-CM

## 2018-12-14 DIAGNOSIS — I10 BENIGN HYPERTENSION: ICD-10-CM

## 2018-12-14 DIAGNOSIS — E78.00 HYPERCHOLESTEROLEMIA: ICD-10-CM

## 2018-12-14 PROCEDURE — 99214 OFFICE O/P EST MOD 30 MIN: CPT | Performed by: INTERNAL MEDICINE

## 2018-12-14 RX ORDER — SIMVASTATIN 20 MG
20 TABLET ORAL DAILY
Qty: 90 TABLET | Refills: 0 | Status: SHIPPED | OUTPATIENT
Start: 2018-12-14 | End: 2019-01-30 | Stop reason: SDUPTHER

## 2018-12-14 RX ORDER — TERAZOSIN 2 MG/1
2 CAPSULE ORAL DAILY
Qty: 90 CAPSULE | Refills: 0 | Status: SHIPPED | OUTPATIENT
Start: 2018-12-14 | End: 2019-01-30 | Stop reason: SDUPTHER

## 2018-12-14 RX ORDER — AMLODIPINE BESYLATE 5 MG/1
5 TABLET ORAL DAILY
Qty: 90 TABLET | Refills: 0 | Status: SHIPPED | OUTPATIENT
Start: 2018-12-14 | End: 2019-01-30 | Stop reason: SDUPTHER

## 2018-12-14 RX ORDER — METOPROLOL TARTRATE AND HYDROCHLOROTHIAZIDE 50; 25 MG/1; MG/1
1 TABLET ORAL DAILY
Qty: 90 TABLET | Refills: 0 | Status: SHIPPED | OUTPATIENT
Start: 2018-12-14 | End: 2018-12-14 | Stop reason: DRUGHIGH

## 2018-12-14 RX ORDER — METOPROLOL SUCCINATE 25 MG/1
25 TABLET, EXTENDED RELEASE ORAL DAILY
Qty: 90 TABLET | Refills: 0 | Status: SHIPPED | OUTPATIENT
Start: 2018-12-14 | End: 2019-01-30 | Stop reason: SDUPTHER

## 2018-12-14 NOTE — PROGRESS NOTES
Assessment/Plan:  1) Benign essential hypertension  Well controlled on current medications  - Continue amlodipine 5mg PO daily  - Continue metoprolol succinate 25mg PO daily at night  - D/c metoprolol-hydrochlorthiazide 50-25mg PO daily  - Follow up CBC and CMP next visit    2) Hypercholesterolemia   - Continue simvastatin 20mg PO daily  - Follow up lipid panels next visit    3) Benign prostatic hyperplasia  - terazosin 2mg PO daily prn   - Follow up PSA levels next visit     4) R sided arthralgias likely due to musculoskeletal strain    - Trial of oral NSAIDs for symptomatic treatment  - Continue monitoring clinically    Subjective:      Patient ID: Nadine Swartz is a 78 y o  male  HPI  This is a 74yo male presenting for follow up on hypertension, hyperlipidemia, and prostate screening  Blood pressure is well controlled on current medications and is 122/68 this visit  Lipid panel showed total cholesterol of 126, with LDL of 65  He denies lightheadedness, chest pain, dyspnea, orthopnea, and lower extremity edema  PSA on 12/8/18 was 2 3, which is slightly elevated from 1 8 over a year ago  The patient reports increased frequency of urination and hesitation, but no dysuria or flank pain  He does not currently take terazosin for symptoms  The patient's main complaint this visit was some recent arthralgia in the right extremities and shoulders  The pain is 5/10 in severity and is not associated with weakness, erythema, swelling, or joint deformities  He denies morning stiffness  He does not have a history of gout attacks  The patient would like to hold off on medications for now but may trial NSAIDs if the pain worsens in severity  Review of Systems   Constitutional: Negative  HENT: Negative  Eyes: Negative  Respiratory: Negative  Cardiovascular: Negative  Gastrointestinal: Negative  Endocrine: Negative  Genitourinary: Positive for frequency  Negative for difficulty urinating  Musculoskeletal: Positive for arthralgias  Allergic/Immunologic: Negative  Neurological: Negative  Hematological: Negative  Psychiatric/Behavioral: Negative  Objective:      /68 (BP Location: Left arm, Patient Position: Sitting, Cuff Size: Standard)   Pulse 65   Temp 97 8 °F (36 6 °C) (Oral)   Ht 5' 1" (1 549 m)   Wt 70 9 kg (156 lb 6 4 oz)   SpO2 98%   BMI 29 55 kg/m²          Physical Exam   Constitutional: He is oriented to person, place, and time  He appears well-developed and well-nourished  No distress  HENT:   Head: Normocephalic and atraumatic  Mouth/Throat: No oropharyngeal exudate  Eyes: Pupils are equal, round, and reactive to light  Right eye exhibits no discharge  Left eye exhibits no discharge  Neck: Normal range of motion  Neck supple  No JVD present  No tracheal deviation present  No thyromegaly present  Cardiovascular: Normal rate, regular rhythm, normal heart sounds and intact distal pulses  Exam reveals no gallop and no friction rub  No murmur heard  Pulmonary/Chest: Effort normal and breath sounds normal  He has no wheezes  He has no rales  Abdominal: Soft  Bowel sounds are normal  He exhibits no distension  There is no tenderness  Musculoskeletal: Normal range of motion  He exhibits tenderness  He exhibits no edema or deformity  Right ankle: Tenderness  Lymphadenopathy:     He has no cervical adenopathy  Neurological: He is alert and oriented to person, place, and time  He has normal reflexes  He displays normal reflexes  No cranial nerve deficit  He exhibits normal muscle tone  Skin: Skin is warm and dry  No rash noted  He is not diaphoretic  No pallor  Psychiatric: He has a normal mood and affect   His behavior is normal  Judgment and thought content normal

## 2018-12-18 DIAGNOSIS — I10 BENIGN HYPERTENSION: ICD-10-CM

## 2018-12-18 RX ORDER — METOPROLOL SUCCINATE 25 MG/1
TABLET, EXTENDED RELEASE ORAL
Qty: 90 TABLET | Refills: 1 | OUTPATIENT
Start: 2018-12-18

## 2019-01-30 DIAGNOSIS — E78.00 HYPERCHOLESTEROLEMIA: ICD-10-CM

## 2019-01-30 DIAGNOSIS — I10 BENIGN ESSENTIAL HYPERTENSION: ICD-10-CM

## 2019-01-30 DIAGNOSIS — I10 BENIGN HYPERTENSION: ICD-10-CM

## 2019-01-30 RX ORDER — TERAZOSIN 2 MG/1
2 CAPSULE ORAL DAILY
Qty: 90 CAPSULE | Refills: 1 | Status: SHIPPED | OUTPATIENT
Start: 2019-01-30 | End: 2019-06-14 | Stop reason: SDUPTHER

## 2019-01-30 RX ORDER — SIMVASTATIN 20 MG
20 TABLET ORAL DAILY
Qty: 90 TABLET | Refills: 1 | Status: SHIPPED | OUTPATIENT
Start: 2019-01-30 | End: 2019-06-14 | Stop reason: SDUPTHER

## 2019-01-30 RX ORDER — METOPROLOL SUCCINATE 25 MG/1
25 TABLET, EXTENDED RELEASE ORAL DAILY
Qty: 90 TABLET | Refills: 1 | Status: SHIPPED | OUTPATIENT
Start: 2019-01-30 | End: 2019-03-12 | Stop reason: SDUPTHER

## 2019-01-30 RX ORDER — AMLODIPINE BESYLATE 5 MG/1
5 TABLET ORAL DAILY
Qty: 90 TABLET | Refills: 1 | Status: SHIPPED | OUTPATIENT
Start: 2019-01-30 | End: 2019-06-14 | Stop reason: SDUPTHER

## 2019-03-12 ENCOUNTER — CLINICAL SUPPORT (OUTPATIENT)
Dept: INTERNAL MEDICINE CLINIC | Facility: CLINIC | Age: 69
End: 2019-03-12
Payer: MEDICARE

## 2019-03-12 DIAGNOSIS — I10 BENIGN HYPERTENSION: ICD-10-CM

## 2019-03-12 DIAGNOSIS — Z23 NEED FOR PNEUMOCOCCAL VACCINATION: Primary | ICD-10-CM

## 2019-03-12 PROCEDURE — 90732 PPSV23 VACC 2 YRS+ SUBQ/IM: CPT

## 2019-03-12 PROCEDURE — G0009 ADMIN PNEUMOCOCCAL VACCINE: HCPCS

## 2019-03-12 RX ORDER — METOPROLOL SUCCINATE 25 MG/1
25 TABLET, EXTENDED RELEASE ORAL DAILY
Qty: 90 TABLET | Refills: 1 | Status: SHIPPED | OUTPATIENT
Start: 2019-03-12 | End: 2019-06-14 | Stop reason: SDUPTHER

## 2019-06-08 ENCOUNTER — APPOINTMENT (OUTPATIENT)
Dept: LAB | Age: 69
End: 2019-06-08
Payer: MEDICARE

## 2019-06-08 DIAGNOSIS — E78.00 HYPERCHOLESTEROLEMIA: ICD-10-CM

## 2019-06-08 DIAGNOSIS — I10 BENIGN HYPERTENSION: ICD-10-CM

## 2019-06-08 DIAGNOSIS — R35.1 NOCTURIA: ICD-10-CM

## 2019-06-08 DIAGNOSIS — Z12.5 SCREENING FOR PROSTATE CANCER: ICD-10-CM

## 2019-06-08 DIAGNOSIS — I10 BENIGN ESSENTIAL HYPERTENSION: ICD-10-CM

## 2019-06-08 DIAGNOSIS — D69.6 THROMBOCYTOPENIA (HCC): ICD-10-CM

## 2019-06-08 LAB
ALBUMIN SERPL BCP-MCNC: 4.3 G/DL (ref 3.5–5)
ALP SERPL-CCNC: 82 U/L (ref 46–116)
ALT SERPL W P-5'-P-CCNC: 46 U/L (ref 12–78)
ANION GAP SERPL CALCULATED.3IONS-SCNC: 2 MMOL/L (ref 4–13)
AST SERPL W P-5'-P-CCNC: 24 U/L (ref 5–45)
BILIRUB SERPL-MCNC: 0.84 MG/DL (ref 0.2–1)
BUN SERPL-MCNC: 15 MG/DL (ref 5–25)
CALCIUM SERPL-MCNC: 9.1 MG/DL (ref 8.3–10.1)
CHLORIDE SERPL-SCNC: 107 MMOL/L (ref 100–108)
CHOLEST SERPL-MCNC: 120 MG/DL (ref 50–200)
CO2 SERPL-SCNC: 31 MMOL/L (ref 21–32)
CREAT SERPL-MCNC: 1.04 MG/DL (ref 0.6–1.3)
ERYTHROCYTE [DISTWIDTH] IN BLOOD BY AUTOMATED COUNT: 13.2 % (ref 11.6–15.1)
GFR SERPL CREATININE-BSD FRML MDRD: 74 ML/MIN/1.73SQ M
GLUCOSE P FAST SERPL-MCNC: 88 MG/DL (ref 65–99)
HCT VFR BLD AUTO: 46.8 % (ref 36.5–49.3)
HDLC SERPL-MCNC: 36 MG/DL (ref 40–60)
HGB BLD-MCNC: 15.1 G/DL (ref 12–17)
LDLC SERPL CALC-MCNC: 62 MG/DL (ref 0–100)
MCH RBC QN AUTO: 29 PG (ref 26.8–34.3)
MCHC RBC AUTO-ENTMCNC: 32.3 G/DL (ref 31.4–37.4)
MCV RBC AUTO: 90 FL (ref 82–98)
NONHDLC SERPL-MCNC: 84 MG/DL
PLATELET # BLD AUTO: 137 THOUSANDS/UL (ref 149–390)
PMV BLD AUTO: 11.6 FL (ref 8.9–12.7)
POTASSIUM SERPL-SCNC: 4.6 MMOL/L (ref 3.5–5.3)
PROT SERPL-MCNC: 8.2 G/DL (ref 6.4–8.2)
RBC # BLD AUTO: 5.21 MILLION/UL (ref 3.88–5.62)
SODIUM SERPL-SCNC: 140 MMOL/L (ref 136–145)
TRIGL SERPL-MCNC: 108 MG/DL
WBC # BLD AUTO: 4.1 THOUSAND/UL (ref 4.31–10.16)

## 2019-06-08 PROCEDURE — 36415 COLL VENOUS BLD VENIPUNCTURE: CPT

## 2019-06-08 PROCEDURE — 80061 LIPID PANEL: CPT

## 2019-06-08 PROCEDURE — 80053 COMPREHEN METABOLIC PANEL: CPT

## 2019-06-08 PROCEDURE — 84154 ASSAY OF PSA FREE: CPT

## 2019-06-08 PROCEDURE — 85027 COMPLETE CBC AUTOMATED: CPT

## 2019-06-08 PROCEDURE — 84153 ASSAY OF PSA TOTAL: CPT

## 2019-06-11 LAB
PSA FREE MFR SERPL: 24.1 %
PSA FREE SERPL-MCNC: 0.53 NG/ML
PSA SERPL-MCNC: 2.2 NG/ML (ref 0–4)

## 2019-06-14 ENCOUNTER — OFFICE VISIT (OUTPATIENT)
Dept: INTERNAL MEDICINE CLINIC | Facility: CLINIC | Age: 69
End: 2019-06-14
Payer: MEDICARE

## 2019-06-14 VITALS
WEIGHT: 160.8 LBS | OXYGEN SATURATION: 96 % | BODY MASS INDEX: 30.36 KG/M2 | DIASTOLIC BLOOD PRESSURE: 78 MMHG | TEMPERATURE: 98 F | HEIGHT: 61 IN | HEART RATE: 68 BPM | SYSTOLIC BLOOD PRESSURE: 120 MMHG

## 2019-06-14 DIAGNOSIS — Z23 NEED FOR DIPHTHERIA-TETANUS-PERTUSSIS (TDAP) VACCINE: Primary | ICD-10-CM

## 2019-06-14 DIAGNOSIS — I10 BENIGN ESSENTIAL HYPERTENSION: ICD-10-CM

## 2019-06-14 DIAGNOSIS — Z11.59 NEED FOR HEPATITIS C SCREENING TEST: ICD-10-CM

## 2019-06-14 DIAGNOSIS — Z00.00 MEDICARE ANNUAL WELLNESS VISIT, SUBSEQUENT: ICD-10-CM

## 2019-06-14 DIAGNOSIS — E78.00 HYPERCHOLESTEROLEMIA: ICD-10-CM

## 2019-06-14 DIAGNOSIS — I10 BENIGN HYPERTENSION: ICD-10-CM

## 2019-06-14 PROCEDURE — G0439 PPPS, SUBSEQ VISIT: HCPCS | Performed by: INTERNAL MEDICINE

## 2019-06-14 PROCEDURE — 99213 OFFICE O/P EST LOW 20 MIN: CPT | Performed by: INTERNAL MEDICINE

## 2019-06-14 RX ORDER — METOPROLOL SUCCINATE 25 MG/1
25 TABLET, EXTENDED RELEASE ORAL DAILY
Qty: 90 TABLET | Refills: 1 | Status: SHIPPED | OUTPATIENT
Start: 2019-06-14 | End: 2019-12-13 | Stop reason: SDUPTHER

## 2019-06-14 RX ORDER — TERAZOSIN 2 MG/1
2 CAPSULE ORAL DAILY
Qty: 90 CAPSULE | Refills: 1 | Status: SHIPPED | OUTPATIENT
Start: 2019-06-14 | End: 2019-12-13 | Stop reason: SDUPTHER

## 2019-06-14 RX ORDER — AMLODIPINE BESYLATE 5 MG/1
5 TABLET ORAL DAILY
Qty: 90 TABLET | Refills: 1 | Status: SHIPPED | OUTPATIENT
Start: 2019-06-14 | End: 2019-12-13 | Stop reason: SDUPTHER

## 2019-06-14 RX ORDER — SIMVASTATIN 20 MG
20 TABLET ORAL DAILY
Qty: 90 TABLET | Refills: 1 | Status: SHIPPED | OUTPATIENT
Start: 2019-06-14 | End: 2019-12-13 | Stop reason: SDUPTHER

## 2019-12-03 ENCOUNTER — APPOINTMENT (OUTPATIENT)
Dept: LAB | Age: 69
End: 2019-12-03
Payer: MEDICARE

## 2019-12-03 DIAGNOSIS — I10 BENIGN ESSENTIAL HYPERTENSION: ICD-10-CM

## 2019-12-03 DIAGNOSIS — Z11.59 NEED FOR HEPATITIS C SCREENING TEST: ICD-10-CM

## 2019-12-03 DIAGNOSIS — E78.00 HYPERCHOLESTEROLEMIA: ICD-10-CM

## 2019-12-03 LAB
ALBUMIN SERPL BCP-MCNC: 4.2 G/DL (ref 3.5–5)
ALP SERPL-CCNC: 70 U/L (ref 46–116)
ALT SERPL W P-5'-P-CCNC: 32 U/L (ref 12–78)
ANION GAP SERPL CALCULATED.3IONS-SCNC: 2 MMOL/L (ref 4–13)
AST SERPL W P-5'-P-CCNC: 17 U/L (ref 5–45)
BILIRUB SERPL-MCNC: 1.01 MG/DL (ref 0.2–1)
BUN SERPL-MCNC: 12 MG/DL (ref 5–25)
CALCIUM SERPL-MCNC: 9.2 MG/DL (ref 8.3–10.1)
CHLORIDE SERPL-SCNC: 109 MMOL/L (ref 100–108)
CO2 SERPL-SCNC: 31 MMOL/L (ref 21–32)
CREAT SERPL-MCNC: 1 MG/DL (ref 0.6–1.3)
ERYTHROCYTE [DISTWIDTH] IN BLOOD BY AUTOMATED COUNT: 12.9 % (ref 11.6–15.1)
GFR SERPL CREATININE-BSD FRML MDRD: 78 ML/MIN/1.73SQ M
GLUCOSE P FAST SERPL-MCNC: 90 MG/DL (ref 65–99)
HCT VFR BLD AUTO: 46.8 % (ref 36.5–49.3)
HCV AB SER QL: NORMAL
HGB BLD-MCNC: 15.2 G/DL (ref 12–17)
MCH RBC QN AUTO: 29.2 PG (ref 26.8–34.3)
MCHC RBC AUTO-ENTMCNC: 32.5 G/DL (ref 31.4–37.4)
MCV RBC AUTO: 90 FL (ref 82–98)
PLATELET # BLD AUTO: 142 THOUSANDS/UL (ref 149–390)
PMV BLD AUTO: 11.6 FL (ref 8.9–12.7)
POTASSIUM SERPL-SCNC: 4.2 MMOL/L (ref 3.5–5.3)
PROT SERPL-MCNC: 8.2 G/DL (ref 6.4–8.2)
RBC # BLD AUTO: 5.2 MILLION/UL (ref 3.88–5.62)
SODIUM SERPL-SCNC: 142 MMOL/L (ref 136–145)
WBC # BLD AUTO: 4.54 THOUSAND/UL (ref 4.31–10.16)

## 2019-12-03 PROCEDURE — 80053 COMPREHEN METABOLIC PANEL: CPT

## 2019-12-03 PROCEDURE — 86803 HEPATITIS C AB TEST: CPT

## 2019-12-03 PROCEDURE — 36415 COLL VENOUS BLD VENIPUNCTURE: CPT

## 2019-12-03 PROCEDURE — 85027 COMPLETE CBC AUTOMATED: CPT

## 2019-12-13 ENCOUNTER — OFFICE VISIT (OUTPATIENT)
Dept: INTERNAL MEDICINE CLINIC | Facility: CLINIC | Age: 69
End: 2019-12-13
Payer: MEDICARE

## 2019-12-13 VITALS
BODY MASS INDEX: 27.26 KG/M2 | HEART RATE: 67 BPM | HEIGHT: 65 IN | SYSTOLIC BLOOD PRESSURE: 122 MMHG | TEMPERATURE: 97.8 F | OXYGEN SATURATION: 97 % | DIASTOLIC BLOOD PRESSURE: 70 MMHG | WEIGHT: 163.6 LBS

## 2019-12-13 DIAGNOSIS — Z23 NEED FOR INFLUENZA VACCINATION: Primary | ICD-10-CM

## 2019-12-13 DIAGNOSIS — Z12.5 SCREENING FOR PROSTATE CANCER: ICD-10-CM

## 2019-12-13 DIAGNOSIS — D69.6 THROMBOCYTOPENIA (HCC): ICD-10-CM

## 2019-12-13 DIAGNOSIS — E78.00 HYPERCHOLESTEROLEMIA: ICD-10-CM

## 2019-12-13 DIAGNOSIS — R35.1 NOCTURIA: ICD-10-CM

## 2019-12-13 DIAGNOSIS — I10 BENIGN HYPERTENSION: ICD-10-CM

## 2019-12-13 DIAGNOSIS — I10 BENIGN ESSENTIAL HYPERTENSION: ICD-10-CM

## 2019-12-13 PROCEDURE — 99214 OFFICE O/P EST MOD 30 MIN: CPT | Performed by: INTERNAL MEDICINE

## 2019-12-13 RX ORDER — AMLODIPINE BESYLATE 5 MG/1
5 TABLET ORAL DAILY
Qty: 90 TABLET | Refills: 1 | Status: SHIPPED | OUTPATIENT
Start: 2019-12-13 | End: 2020-05-21 | Stop reason: SDUPTHER

## 2019-12-13 RX ORDER — METOPROLOL SUCCINATE 25 MG/1
25 TABLET, EXTENDED RELEASE ORAL DAILY
Qty: 90 TABLET | Refills: 1 | Status: SHIPPED | OUTPATIENT
Start: 2019-12-13 | End: 2020-05-21 | Stop reason: SDUPTHER

## 2019-12-13 RX ORDER — TERAZOSIN 2 MG/1
2 CAPSULE ORAL DAILY
Qty: 90 CAPSULE | Refills: 1 | Status: SHIPPED | OUTPATIENT
Start: 2019-12-13 | End: 2020-05-21 | Stop reason: SDUPTHER

## 2019-12-13 RX ORDER — SIMVASTATIN 20 MG
20 TABLET ORAL DAILY
Qty: 90 TABLET | Refills: 1 | Status: SHIPPED | OUTPATIENT
Start: 2019-12-13 | End: 2020-05-21 | Stop reason: SDUPTHER

## 2019-12-13 NOTE — PROGRESS NOTES
Assessment/Plan:    Hypercholesterolemia  -Lipid panel in June 2019 was WNL   -encouraged patient to continue to eat a healthy diet and exercise regularly  -continue Zocor 20mg daily  -repeat lipid panel and CMP before next visit    Benign essential hypertension  -BP is well controlled on current regimen   -continue Terazosin 2mg daily,Toprol-XL 25mg daily, Amlodipine 5mg daily    Thrombocytopenia (HCC)  -platelet count 512, appears to be patient's baseline  -patient has no symptoms of easy bruising or bleeding  -repeat CBC and Platelet before next visit    Nocturia   -patient wakes up several times a night to urinate   -last PSA in June was 2 2  -will repeat PSA before next visit        Subjective:      Patient ID: Josias Falk is a 80 y o  male  Patient is here for a follow-up visit  He is doing well  We reviewed his recent bloodwork  Encouraged patient to continue to eat a healthy diet and to continue with his exercise of walking several days a week  He has no acute complaints at this time  The following portions of the patient's history were reviewed and updated as appropriate: allergies, current medications, past family history, past medical history, past social history, past surgical history and problem list     Review of Systems   Constitutional: Negative for chills, fatigue, fever and unexpected weight change  HENT: Positive for congestion  Negative for rhinorrhea, sinus pressure, sinus pain and sore throat  Respiratory: Negative for shortness of breath  Cardiovascular: Negative for chest pain  Gastrointestinal: Negative for constipation, diarrhea, nausea and vomiting  Genitourinary: Negative for difficulty urinating  Musculoskeletal: Positive for arthralgias (morning knee stiffness)  Negative for myalgias  Neurological: Negative for dizziness, light-headedness and headaches  Psychiatric/Behavioral: Negative for sleep disturbance           Objective:      /70 (BP Location: Left arm, Patient Position: Sitting, Cuff Size: Adult)   Pulse 67   Temp 97 8 °F (36 6 °C) (Oral)   Ht 5' 5 16" (1 655 m) Comment: with shoes  Wt 74 2 kg (163 lb 9 6 oz) Comment: with shoes  SpO2 97% Comment: room air  BMI 27 09 kg/m²          Physical Exam   Constitutional: He is oriented to person, place, and time  He appears well-developed and well-nourished  Eyes: Pupils are equal, round, and reactive to light  Conjunctivae are normal    Neck: Neck supple  Cardiovascular: Normal rate, regular rhythm, normal heart sounds and intact distal pulses  Pulmonary/Chest: Effort normal and breath sounds normal    Abdominal: Soft  He exhibits no distension  There is no tenderness  Musculoskeletal: He exhibits no edema or tenderness  Lymphadenopathy:     He has no cervical adenopathy  Neurological: He is alert and oriented to person, place, and time  Skin: Skin is warm and dry

## 2020-05-20 DIAGNOSIS — I10 BENIGN ESSENTIAL HYPERTENSION: ICD-10-CM

## 2020-05-20 DIAGNOSIS — I10 BENIGN HYPERTENSION: ICD-10-CM

## 2020-05-20 DIAGNOSIS — E78.00 HYPERCHOLESTEROLEMIA: ICD-10-CM

## 2020-05-21 RX ORDER — METOPROLOL SUCCINATE 25 MG/1
25 TABLET, EXTENDED RELEASE ORAL DAILY
Qty: 90 TABLET | Refills: 1 | Status: SHIPPED | OUTPATIENT
Start: 2020-05-21 | End: 2020-06-26 | Stop reason: SDUPTHER

## 2020-05-21 RX ORDER — SIMVASTATIN 20 MG
20 TABLET ORAL DAILY
Qty: 90 TABLET | Refills: 1 | Status: SHIPPED | OUTPATIENT
Start: 2020-05-21 | End: 2020-06-26 | Stop reason: SDUPTHER

## 2020-05-21 RX ORDER — TERAZOSIN 2 MG/1
2 CAPSULE ORAL DAILY
Qty: 90 CAPSULE | Refills: 1 | Status: SHIPPED | OUTPATIENT
Start: 2020-05-21 | End: 2020-06-26 | Stop reason: SDUPTHER

## 2020-05-21 RX ORDER — AMLODIPINE BESYLATE 5 MG/1
5 TABLET ORAL DAILY
Qty: 90 TABLET | Refills: 1 | Status: SHIPPED | OUTPATIENT
Start: 2020-05-21 | End: 2020-06-26 | Stop reason: SDUPTHER

## 2020-06-20 ENCOUNTER — APPOINTMENT (OUTPATIENT)
Dept: LAB | Age: 70
End: 2020-06-20
Payer: MEDICARE

## 2020-06-20 DIAGNOSIS — Z12.5 SCREENING FOR PROSTATE CANCER: ICD-10-CM

## 2020-06-20 DIAGNOSIS — D69.6 THROMBOCYTOPENIA (HCC): ICD-10-CM

## 2020-06-20 DIAGNOSIS — E78.00 HYPERCHOLESTEROLEMIA: ICD-10-CM

## 2020-06-20 DIAGNOSIS — I10 BENIGN HYPERTENSION: ICD-10-CM

## 2020-06-20 DIAGNOSIS — R35.1 NOCTURIA: ICD-10-CM

## 2020-06-20 LAB
ALBUMIN SERPL BCP-MCNC: 4.2 G/DL (ref 3.5–5)
ALP SERPL-CCNC: 73 U/L (ref 46–116)
ALT SERPL W P-5'-P-CCNC: 36 U/L (ref 12–78)
ANION GAP SERPL CALCULATED.3IONS-SCNC: 0 MMOL/L (ref 4–13)
AST SERPL W P-5'-P-CCNC: 20 U/L (ref 5–45)
BILIRUB SERPL-MCNC: 0.92 MG/DL (ref 0.2–1)
BUN SERPL-MCNC: 10 MG/DL (ref 5–25)
CALCIUM SERPL-MCNC: 9.2 MG/DL (ref 8.3–10.1)
CHLORIDE SERPL-SCNC: 109 MMOL/L (ref 100–108)
CHOLEST SERPL-MCNC: 112 MG/DL (ref 50–200)
CO2 SERPL-SCNC: 32 MMOL/L (ref 21–32)
CREAT SERPL-MCNC: 0.94 MG/DL (ref 0.6–1.3)
ERYTHROCYTE [DISTWIDTH] IN BLOOD BY AUTOMATED COUNT: 12.5 % (ref 11.6–15.1)
GFR SERPL CREATININE-BSD FRML MDRD: 83 ML/MIN/1.73SQ M
GLUCOSE P FAST SERPL-MCNC: 86 MG/DL (ref 65–99)
HCT VFR BLD AUTO: 46.6 % (ref 36.5–49.3)
HDLC SERPL-MCNC: 37 MG/DL
HGB BLD-MCNC: 15.4 G/DL (ref 12–17)
LDLC SERPL CALC-MCNC: 55 MG/DL (ref 0–100)
MCH RBC QN AUTO: 29.6 PG (ref 26.8–34.3)
MCHC RBC AUTO-ENTMCNC: 33 G/DL (ref 31.4–37.4)
MCV RBC AUTO: 90 FL (ref 82–98)
NONHDLC SERPL-MCNC: 75 MG/DL
PLATELET # BLD AUTO: 110 THOUSANDS/UL (ref 149–390)
PMV BLD AUTO: 11.7 FL (ref 8.9–12.7)
POTASSIUM SERPL-SCNC: 4.7 MMOL/L (ref 3.5–5.3)
PROT SERPL-MCNC: 8 G/DL (ref 6.4–8.2)
PSA SERPL-MCNC: 2.3 NG/ML (ref 0–4)
RBC # BLD AUTO: 5.2 MILLION/UL (ref 3.88–5.62)
SODIUM SERPL-SCNC: 141 MMOL/L (ref 136–145)
TRIGL SERPL-MCNC: 99 MG/DL
WBC # BLD AUTO: 4.67 THOUSAND/UL (ref 4.31–10.16)

## 2020-06-20 PROCEDURE — 80053 COMPREHEN METABOLIC PANEL: CPT

## 2020-06-20 PROCEDURE — 80061 LIPID PANEL: CPT

## 2020-06-20 PROCEDURE — 36415 COLL VENOUS BLD VENIPUNCTURE: CPT

## 2020-06-20 PROCEDURE — 84153 ASSAY OF PSA TOTAL: CPT

## 2020-06-20 PROCEDURE — 85027 COMPLETE CBC AUTOMATED: CPT

## 2020-06-26 ENCOUNTER — OFFICE VISIT (OUTPATIENT)
Dept: INTERNAL MEDICINE CLINIC | Facility: CLINIC | Age: 70
End: 2020-06-26
Payer: MEDICARE

## 2020-06-26 VITALS
OXYGEN SATURATION: 98 % | TEMPERATURE: 97 F | DIASTOLIC BLOOD PRESSURE: 72 MMHG | HEIGHT: 65 IN | HEART RATE: 66 BPM | WEIGHT: 156.6 LBS | BODY MASS INDEX: 26.09 KG/M2 | SYSTOLIC BLOOD PRESSURE: 108 MMHG

## 2020-06-26 DIAGNOSIS — I10 BENIGN ESSENTIAL HYPERTENSION: ICD-10-CM

## 2020-06-26 DIAGNOSIS — R35.1 NOCTURIA: ICD-10-CM

## 2020-06-26 DIAGNOSIS — I10 BENIGN HYPERTENSION: ICD-10-CM

## 2020-06-26 DIAGNOSIS — E78.00 HYPERCHOLESTEROLEMIA: ICD-10-CM

## 2020-06-26 DIAGNOSIS — Z00.00 MEDICARE ANNUAL WELLNESS VISIT, SUBSEQUENT: ICD-10-CM

## 2020-06-26 DIAGNOSIS — D69.6 THROMBOCYTOPENIA (HCC): Primary | ICD-10-CM

## 2020-06-26 PROBLEM — H25.89 OTHER AGE-RELATED CATARACT: Status: ACTIVE | Noted: 2020-06-26

## 2020-06-26 PROCEDURE — 1123F ACP DISCUSS/DSCN MKR DOCD: CPT | Performed by: INTERNAL MEDICINE

## 2020-06-26 PROCEDURE — 3074F SYST BP LT 130 MM HG: CPT | Performed by: INTERNAL MEDICINE

## 2020-06-26 PROCEDURE — 1160F RVW MEDS BY RX/DR IN RCRD: CPT | Performed by: INTERNAL MEDICINE

## 2020-06-26 PROCEDURE — 4040F PNEUMOC VAC/ADMIN/RCVD: CPT | Performed by: INTERNAL MEDICINE

## 2020-06-26 PROCEDURE — 1036F TOBACCO NON-USER: CPT | Performed by: INTERNAL MEDICINE

## 2020-06-26 PROCEDURE — 1125F AMNT PAIN NOTED PAIN PRSNT: CPT | Performed by: INTERNAL MEDICINE

## 2020-06-26 PROCEDURE — 3008F BODY MASS INDEX DOCD: CPT | Performed by: INTERNAL MEDICINE

## 2020-06-26 PROCEDURE — 1170F FXNL STATUS ASSESSED: CPT | Performed by: INTERNAL MEDICINE

## 2020-06-26 PROCEDURE — 3078F DIAST BP <80 MM HG: CPT | Performed by: INTERNAL MEDICINE

## 2020-06-26 PROCEDURE — 99214 OFFICE O/P EST MOD 30 MIN: CPT | Performed by: INTERNAL MEDICINE

## 2020-06-26 PROCEDURE — G0438 PPPS, INITIAL VISIT: HCPCS | Performed by: INTERNAL MEDICINE

## 2020-06-26 RX ORDER — SIMVASTATIN 20 MG
20 TABLET ORAL DAILY
Qty: 90 TABLET | Refills: 1 | Status: SHIPPED | OUTPATIENT
Start: 2020-06-26 | End: 2021-01-08

## 2020-06-26 RX ORDER — METOPROLOL SUCCINATE 25 MG/1
25 TABLET, EXTENDED RELEASE ORAL DAILY
Qty: 90 TABLET | Refills: 1 | Status: SHIPPED | OUTPATIENT
Start: 2020-06-26 | End: 2021-01-08

## 2020-06-26 RX ORDER — AMLODIPINE BESYLATE 5 MG/1
5 TABLET ORAL DAILY
Qty: 90 TABLET | Refills: 1 | Status: SHIPPED | OUTPATIENT
Start: 2020-06-26 | End: 2021-01-08

## 2020-06-26 RX ORDER — TERAZOSIN 2 MG/1
2 CAPSULE ORAL DAILY
Qty: 90 CAPSULE | Refills: 1 | Status: SHIPPED | OUTPATIENT
Start: 2020-06-26 | End: 2021-01-19 | Stop reason: SDUPTHER

## 2020-10-22 LAB — HBA1C MFR BLD HPLC: 5.9 %

## 2021-01-07 DIAGNOSIS — E78.00 HYPERCHOLESTEROLEMIA: ICD-10-CM

## 2021-01-07 DIAGNOSIS — I10 BENIGN HYPERTENSION: ICD-10-CM

## 2021-01-08 RX ORDER — SIMVASTATIN 20 MG
TABLET ORAL
Qty: 90 TABLET | Refills: 3 | Status: SHIPPED | OUTPATIENT
Start: 2021-01-08 | End: 2022-02-28 | Stop reason: SDUPTHER

## 2021-01-08 RX ORDER — METOPROLOL SUCCINATE 25 MG/1
TABLET, EXTENDED RELEASE ORAL
Qty: 90 TABLET | Refills: 3 | Status: SHIPPED | OUTPATIENT
Start: 2021-01-08 | End: 2022-02-28 | Stop reason: SDUPTHER

## 2021-01-08 RX ORDER — AMLODIPINE BESYLATE 5 MG/1
TABLET ORAL
Qty: 90 TABLET | Refills: 3 | Status: SHIPPED | OUTPATIENT
Start: 2021-01-08 | End: 2022-02-28 | Stop reason: SDUPTHER

## 2021-01-09 ENCOUNTER — LAB (OUTPATIENT)
Dept: LAB | Age: 71
End: 2021-01-09
Payer: MEDICARE

## 2021-01-09 DIAGNOSIS — D69.6 THROMBOCYTOPENIA (HCC): ICD-10-CM

## 2021-01-09 DIAGNOSIS — I10 BENIGN HYPERTENSION: ICD-10-CM

## 2021-01-09 LAB
ALBUMIN SERPL BCP-MCNC: 4.3 G/DL (ref 3.5–5)
ALP SERPL-CCNC: 72 U/L (ref 46–116)
ALT SERPL W P-5'-P-CCNC: 34 U/L (ref 12–78)
ANION GAP SERPL CALCULATED.3IONS-SCNC: 2 MMOL/L (ref 4–13)
AST SERPL W P-5'-P-CCNC: 19 U/L (ref 5–45)
BILIRUB SERPL-MCNC: 1.04 MG/DL (ref 0.2–1)
BUN SERPL-MCNC: 13 MG/DL (ref 5–25)
CALCIUM SERPL-MCNC: 9.4 MG/DL (ref 8.3–10.1)
CHLORIDE SERPL-SCNC: 108 MMOL/L (ref 100–108)
CO2 SERPL-SCNC: 32 MMOL/L (ref 21–32)
CREAT SERPL-MCNC: 0.99 MG/DL (ref 0.6–1.3)
ERYTHROCYTE [DISTWIDTH] IN BLOOD BY AUTOMATED COUNT: 12.7 % (ref 11.6–15.1)
GFR SERPL CREATININE-BSD FRML MDRD: 77 ML/MIN/1.73SQ M
GLUCOSE SERPL-MCNC: 83 MG/DL (ref 65–140)
HCT VFR BLD AUTO: 46 % (ref 36.5–49.3)
HGB BLD-MCNC: 15.2 G/DL (ref 12–17)
MCH RBC QN AUTO: 29.2 PG (ref 26.8–34.3)
MCHC RBC AUTO-ENTMCNC: 33 G/DL (ref 31.4–37.4)
MCV RBC AUTO: 88 FL (ref 82–98)
PLATELET # BLD AUTO: 138 THOUSANDS/UL (ref 149–390)
PMV BLD AUTO: 11.7 FL (ref 8.9–12.7)
POTASSIUM SERPL-SCNC: 4.2 MMOL/L (ref 3.5–5.3)
PROT SERPL-MCNC: 8.2 G/DL (ref 6.4–8.2)
RBC # BLD AUTO: 5.21 MILLION/UL (ref 3.88–5.62)
SODIUM SERPL-SCNC: 142 MMOL/L (ref 136–145)
WBC # BLD AUTO: 5.08 THOUSAND/UL (ref 4.31–10.16)

## 2021-01-09 PROCEDURE — 85027 COMPLETE CBC AUTOMATED: CPT

## 2021-01-09 PROCEDURE — 36415 COLL VENOUS BLD VENIPUNCTURE: CPT

## 2021-01-09 PROCEDURE — 80053 COMPREHEN METABOLIC PANEL: CPT

## 2021-01-19 ENCOUNTER — OFFICE VISIT (OUTPATIENT)
Dept: INTERNAL MEDICINE CLINIC | Facility: CLINIC | Age: 71
End: 2021-01-19
Payer: MEDICARE

## 2021-01-19 VITALS
HEART RATE: 66 BPM | SYSTOLIC BLOOD PRESSURE: 136 MMHG | OXYGEN SATURATION: 98 % | HEIGHT: 64 IN | BODY MASS INDEX: 26.87 KG/M2 | TEMPERATURE: 98.1 F | DIASTOLIC BLOOD PRESSURE: 80 MMHG | WEIGHT: 157.4 LBS

## 2021-01-19 DIAGNOSIS — E78.00 HYPERCHOLESTEROLEMIA: ICD-10-CM

## 2021-01-19 DIAGNOSIS — D69.6 THROMBOCYTOPENIA (HCC): Primary | ICD-10-CM

## 2021-01-19 DIAGNOSIS — I10 BENIGN ESSENTIAL HYPERTENSION: ICD-10-CM

## 2021-01-19 DIAGNOSIS — E80.4 GILBERT'S SYNDROME: ICD-10-CM

## 2021-01-19 PROBLEM — M35.00 SJOGREN'S SYNDROME (HCC): Status: ACTIVE | Noted: 2021-01-19

## 2021-01-19 PROCEDURE — 99214 OFFICE O/P EST MOD 30 MIN: CPT | Performed by: INTERNAL MEDICINE

## 2021-01-19 RX ORDER — TERAZOSIN 2 MG/1
2 CAPSULE ORAL DAILY
Qty: 90 CAPSULE | Refills: 1 | Status: SHIPPED | OUTPATIENT
Start: 2021-01-19 | End: 2021-06-09 | Stop reason: SDUPTHER

## 2021-01-19 RX ORDER — ASPIRIN 81 MG/1
81 TABLET ORAL DAILY
COMMUNITY

## 2021-01-19 NOTE — ASSESSMENT & PLAN NOTE
Cholesterol levels are near goal   HDL cholesterol levels are slightly low at 37  No changes to current medications    Follow-up in 6 months

## 2021-01-19 NOTE — PROGRESS NOTES
Assessment/Plan: Thrombocytopenia ([facility] [address] )  Very mild thrombocytopenia  Will continue monitor  Hypercholesterolemia  Cholesterol levels are near goal   HDL cholesterol levels are slightly low at 37  No changes to current medications  Follow-up in 6 months    Gilbert's syndrome  Patient has experienced periodic elevations of serum bilirubin which is of no consequence most likely Gilbert's syndrome    Benign essential hypertension  Blood pressure in the high range of normal   No changes are necessary to medications at this current time       Diagnoses and all orders for this visit:    Thrombocytopenia ([facility] [address] )  -     CBC and Platelet; Future    Hypercholesterolemia  -     Comprehensive metabolic panel; Future  -     CBC and Platelet; Future    Benign essential hypertension  -     terazosin (HYTRIN) 2 mg capsule; Take 1 capsule (2 mg total) by mouth daily  -     Comprehensive metabolic panel; Future  -     CBC and Platelet; Future    Gilbert's syndrome  -     Comprehensive metabolic panel; Future    Other orders  -     aspirin (ECOTRIN LOW STRENGTH) 81 mg EC tablet; Take 81 mg by mouth daily          Subjective:      Patient ID: Lacy Marcus is a 72 y o  male  Patient presents to the office for follow-up visit  He has no particular complaints  Labs were reviewed  He has no issues with any of his current medications  BMI is noted to be slightly elevated some adjustments to his diet and increase physical activity would be appropriate  Labs are reviewed  CBC is within normal limits  Platelet count is low low normal but not of anything clinically consequential at this time  Metabolic panel is significant only for a mildly elevated serum bilirubin 1 04 most likely reflecting Gilbert's syndrome        Family History   Problem Relation Age of Onset    Hypertension Mother         benign essential     Social History     Socioeconomic History    Marital status: /Civil Union     Spouse name: Not on file    Number of children: Not on file    Years of education: Not on file    Highest education level: Not on file   Occupational History    Not on file   Social Needs    Financial resource strain: Not on file    Food insecurity     Worry: Not on file     Inability: Not on file    Transportation needs     Medical: Not on file     Non-medical: Not on file   Tobacco Use    Smoking status: Never Smoker    Smokeless tobacco: Never Used   Substance and Sexual Activity    Alcohol use: No    Drug use: No    Sexual activity: Yes   Lifestyle    Physical activity     Days per week: Not on file     Minutes per session: Not on file    Stress: Not on file   Relationships    Social connections     Talks on phone: Not on file     Gets together: Not on file     Attends Oriental orthodox service: Not on file     Active member of club or organization: Not on file     Attends meetings of clubs or organizations: Not on file     Relationship status: Not on file    Intimate partner violence     Fear of current or ex partner: Not on file     Emotionally abused: Not on file     Physically abused: Not on file     Forced sexual activity: Not on file   Other Topics Concern    Not on file   Social History Narrative    Not on file     History reviewed  No pertinent past medical history  Current Outpatient Medications:     amLODIPine (NORVASC) 5 mg tablet, TAKE 1 TABLET DAILY, Disp: 90 tablet, Rfl: 3    aspirin (ECOTRIN LOW STRENGTH) 81 mg EC tablet, Take 81 mg by mouth daily, Disp: , Rfl:     metoprolol succinate (TOPROL-XL) 25 mg 24 hr tablet, TAKE 1 TABLET DAILY, Disp: 90 tablet, Rfl: 3    simvastatin (ZOCOR) 20 mg tablet, TAKE 1 TABLET DAILY, Disp: 90 tablet, Rfl: 3    terazosin (HYTRIN) 2 mg capsule, Take 1 capsule (2 mg total) by mouth daily, Disp: 90 capsule, Rfl: 1  No Known Allergies  Past Surgical History:   Procedure Laterality Date    HERNIA REPAIR           Review of Systems   Constitutional: Negative  HENT: Negative  Eyes: Negative  Respiratory: Negative  Cardiovascular: Negative  Gastrointestinal: Negative  Endocrine: Negative  Genitourinary: Negative  Musculoskeletal: Negative  Skin: Negative  Allergic/Immunologic: Negative  Neurological: Negative  Hematological: Negative  Psychiatric/Behavioral: Negative  Objective:      /80 (BP Location: Left arm, Patient Position: Sitting, Cuff Size: Standard)   Pulse 66   Temp 98 1 °F (36 7 °C) (Tympanic)   Ht 5' 4 17" (1 63 m)   Wt 71 4 kg (157 lb 6 4 oz)   SpO2 98%   BMI 26 87 kg/m²   BMI Counseling: Body mass index is 26 87 kg/m²  The BMI is above normal  Nutrition recommendations include decreasing overall calorie intake, consuming healthier snacks, moderation in carbohydrate intake and reducing intake of saturated fat and trans fat  Exercise recommendations include exercising 3-5 times per week  Physical Exam  Vitals signs reviewed  Constitutional:       General: He is not in acute distress  Appearance: Normal appearance  He is normal weight  He is not ill-appearing, toxic-appearing or diaphoretic  HENT:      Head: Normocephalic and atraumatic  Right Ear: External ear normal       Left Ear: External ear normal       Nose: Nose normal       Mouth/Throat:      Mouth: Mucous membranes are moist    Eyes:      General: No scleral icterus  Conjunctiva/sclera: Conjunctivae normal       Pupils: Pupils are equal, round, and reactive to light  Neck:      Musculoskeletal: Neck supple  No muscular tenderness  Vascular: No carotid bruit  Cardiovascular:      Rate and Rhythm: Normal rate and regular rhythm  Pulses: Normal pulses  Heart sounds: Normal heart sounds  No murmur  Pulmonary:      Effort: Pulmonary effort is normal  No respiratory distress  Breath sounds: Normal breath sounds  Abdominal:      General: Abdomen is flat  There is no distension  Tenderness:  There is no abdominal tenderness  Musculoskeletal:         General: No swelling  Right lower leg: No edema  Left lower leg: No edema  Lymphadenopathy:      Cervical: No cervical adenopathy  Skin:     Coloration: Skin is not jaundiced  Findings: No erythema or rash  Neurological:      General: No focal deficit present  Mental Status: He is alert and oriented to person, place, and time  Mental status is at baseline  Psychiatric:         Mood and Affect: Mood normal          Behavior: Behavior normal          Thought Content:  Thought content normal          Judgment: Judgment normal

## 2021-01-19 NOTE — ASSESSMENT & PLAN NOTE
Patient has experienced periodic elevations of serum bilirubin which is of no consequence most likely Gilbert's syndrome

## 2021-01-19 NOTE — ASSESSMENT & PLAN NOTE
Blood pressure in the high range of normal   No changes are necessary to medications at this current time

## 2021-02-13 DIAGNOSIS — Z23 ENCOUNTER FOR IMMUNIZATION: ICD-10-CM

## 2021-02-17 ENCOUNTER — IMMUNIZATIONS (OUTPATIENT)
Dept: FAMILY MEDICINE CLINIC | Facility: HOSPITAL | Age: 71
End: 2021-02-17

## 2021-02-17 DIAGNOSIS — Z23 ENCOUNTER FOR IMMUNIZATION: Primary | ICD-10-CM

## 2021-02-17 PROCEDURE — 91300 SARS-COV-2 / COVID-19 MRNA VACCINE (PFIZER-BIONTECH) 30 MCG: CPT

## 2021-02-17 PROCEDURE — 0001A SARS-COV-2 / COVID-19 MRNA VACCINE (PFIZER-BIONTECH) 30 MCG: CPT

## 2021-03-10 ENCOUNTER — IMMUNIZATIONS (OUTPATIENT)
Dept: FAMILY MEDICINE CLINIC | Facility: HOSPITAL | Age: 71
End: 2021-03-10

## 2021-03-10 DIAGNOSIS — Z23 ENCOUNTER FOR IMMUNIZATION: Primary | ICD-10-CM

## 2021-03-10 PROCEDURE — 0002A SARS-COV-2 / COVID-19 MRNA VACCINE (PFIZER-BIONTECH) 30 MCG: CPT

## 2021-03-10 PROCEDURE — 91300 SARS-COV-2 / COVID-19 MRNA VACCINE (PFIZER-BIONTECH) 30 MCG: CPT

## 2021-06-09 DIAGNOSIS — I10 BENIGN ESSENTIAL HYPERTENSION: ICD-10-CM

## 2021-06-09 RX ORDER — TERAZOSIN 2 MG/1
2 CAPSULE ORAL DAILY
Qty: 90 CAPSULE | Refills: 1 | Status: SHIPPED | OUTPATIENT
Start: 2021-06-09 | End: 2022-02-28 | Stop reason: SDUPTHER

## 2021-09-07 ENCOUNTER — APPOINTMENT (OUTPATIENT)
Dept: LAB | Age: 71
End: 2021-09-07
Payer: MEDICARE

## 2021-09-07 DIAGNOSIS — E80.4 GILBERT'S SYNDROME: ICD-10-CM

## 2021-09-07 DIAGNOSIS — I10 BENIGN ESSENTIAL HYPERTENSION: ICD-10-CM

## 2021-09-07 DIAGNOSIS — D69.6 THROMBOCYTOPENIA (HCC): ICD-10-CM

## 2021-09-07 DIAGNOSIS — E78.00 HYPERCHOLESTEROLEMIA: ICD-10-CM

## 2021-09-07 LAB
ALBUMIN SERPL BCP-MCNC: 4.2 G/DL (ref 3.5–5)
ALP SERPL-CCNC: 69 U/L (ref 46–116)
ALT SERPL W P-5'-P-CCNC: 37 U/L (ref 12–78)
ANION GAP SERPL CALCULATED.3IONS-SCNC: -2 MMOL/L (ref 4–13)
AST SERPL W P-5'-P-CCNC: 26 U/L (ref 5–45)
BILIRUB SERPL-MCNC: 1.04 MG/DL (ref 0.2–1)
BUN SERPL-MCNC: 15 MG/DL (ref 5–25)
CALCIUM SERPL-MCNC: 9.2 MG/DL (ref 8.3–10.1)
CHLORIDE SERPL-SCNC: 108 MMOL/L (ref 100–108)
CO2 SERPL-SCNC: 31 MMOL/L (ref 21–32)
CREAT SERPL-MCNC: 0.95 MG/DL (ref 0.6–1.3)
ERYTHROCYTE [DISTWIDTH] IN BLOOD BY AUTOMATED COUNT: 12.8 % (ref 11.6–15.1)
GFR SERPL CREATININE-BSD FRML MDRD: 81 ML/MIN/1.73SQ M
GLUCOSE P FAST SERPL-MCNC: 85 MG/DL (ref 65–99)
HCT VFR BLD AUTO: 47.5 % (ref 36.5–49.3)
HGB BLD-MCNC: 15.6 G/DL (ref 12–17)
MCH RBC QN AUTO: 29.3 PG (ref 26.8–34.3)
MCHC RBC AUTO-ENTMCNC: 32.8 G/DL (ref 31.4–37.4)
MCV RBC AUTO: 89 FL (ref 82–98)
PLATELET # BLD AUTO: 136 THOUSANDS/UL (ref 149–390)
PMV BLD AUTO: 11.9 FL (ref 8.9–12.7)
POTASSIUM SERPL-SCNC: 4.7 MMOL/L (ref 3.5–5.3)
PROT SERPL-MCNC: 8.3 G/DL (ref 6.4–8.2)
RBC # BLD AUTO: 5.32 MILLION/UL (ref 3.88–5.62)
SODIUM SERPL-SCNC: 137 MMOL/L (ref 136–145)
WBC # BLD AUTO: 4.32 THOUSAND/UL (ref 4.31–10.16)

## 2021-09-07 PROCEDURE — 85027 COMPLETE CBC AUTOMATED: CPT

## 2021-09-07 PROCEDURE — 80053 COMPREHEN METABOLIC PANEL: CPT

## 2021-09-07 PROCEDURE — 36415 COLL VENOUS BLD VENIPUNCTURE: CPT

## 2021-09-20 ENCOUNTER — OFFICE VISIT (OUTPATIENT)
Dept: INTERNAL MEDICINE CLINIC | Facility: CLINIC | Age: 71
End: 2021-09-20
Payer: MEDICARE

## 2021-09-20 VITALS
DIASTOLIC BLOOD PRESSURE: 76 MMHG | SYSTOLIC BLOOD PRESSURE: 112 MMHG | TEMPERATURE: 98.6 F | BODY MASS INDEX: 26.94 KG/M2 | HEART RATE: 68 BPM | WEIGHT: 157.8 LBS | OXYGEN SATURATION: 98 % | HEIGHT: 64 IN

## 2021-09-20 DIAGNOSIS — E80.4 GILBERT'S SYNDROME: ICD-10-CM

## 2021-09-20 DIAGNOSIS — I10 BENIGN ESSENTIAL HYPERTENSION: Primary | ICD-10-CM

## 2021-09-20 DIAGNOSIS — Z12.5 SCREENING FOR PROSTATE CANCER: ICD-10-CM

## 2021-09-20 DIAGNOSIS — D69.6 THROMBOCYTOPENIA (HCC): ICD-10-CM

## 2021-09-20 DIAGNOSIS — R35.1 NOCTURIA: ICD-10-CM

## 2021-09-20 DIAGNOSIS — M35.01 SJOGREN'S SYNDROME WITH KERATOCONJUNCTIVITIS SICCA (HCC): ICD-10-CM

## 2021-09-20 DIAGNOSIS — Z00.00 MEDICARE ANNUAL WELLNESS VISIT, SUBSEQUENT: ICD-10-CM

## 2021-09-20 PROCEDURE — 99214 OFFICE O/P EST MOD 30 MIN: CPT | Performed by: INTERNAL MEDICINE

## 2021-09-20 PROCEDURE — G0439 PPPS, SUBSEQ VISIT: HCPCS | Performed by: INTERNAL MEDICINE

## 2021-09-20 PROCEDURE — 1123F ACP DISCUSS/DSCN MKR DOCD: CPT | Performed by: INTERNAL MEDICINE

## 2021-09-20 RX ORDER — CARBOXYMETHYLCELLULOSE SODIUM 5 MG/ML
SOLUTION/ DROPS OPHTHALMIC
COMMUNITY
Start: 2021-03-06 | End: 2022-03-21

## 2021-09-20 NOTE — PROGRESS NOTES
Assessment and Plan:     Problem List Items Addressed This Visit     None           Preventive health issues were discussed with patient, and age appropriate screening tests were ordered as noted in patient's After Visit Summary  Personalized health advice and appropriate referrals for health education or preventive services given if needed, as noted in patient's After Visit Summary  History of Present Illness:     Patient presents for Medicare Annual Wellness visit    Patient Care Team:  John Fitzgerald MD as PCP - MD John Funez MD     Problem List:     Patient Active Problem List   Diagnosis    Benign essential hypertension    Hypercholesterolemia    Nocturia    Thrombocytopenia ([facility] )    Screening for prostate cancer    Medicare annual wellness visit, subsequent    Other age-related cataract    Sjogren's syndrome ([facility] )    Gilbert's syndrome      Past Medical and Surgical History:     No past medical history on file    Past Surgical History:   Procedure Laterality Date    HERNIA REPAIR        Family History:     Family History   Problem Relation Age of Onset    Hypertension Mother         benign essential      Social History:     Social History     Socioeconomic History    Marital status: /Civil Union     Spouse name: Not on file    Number of children: Not on file    Years of education: Not on file    Highest education level: Not on file   Occupational History    Not on file   Tobacco Use    Smoking status: Never Smoker    Smokeless tobacco: Never Used   Vaping Use    Vaping Use: Never used   Substance and Sexual Activity    Alcohol use: No    Drug use: No    Sexual activity: Yes   Other Topics Concern    Not on file   Social History Narrative    Not on file     Social Determinants of Health     Financial Resource Strain:     Difficulty of Paying Living Expenses:    Food Insecurity:     Worried About Running Out of Food in the Last Year:     Ran Out of Food in the Last Year:    Transportation Needs:     Lack of Transportation (Medical):  Lack of Transportation (Non-Medical):    Physical Activity:     Days of Exercise per Week:     Minutes of Exercise per Session:    Stress:     Feeling of Stress :    Social Connections:     Frequency of Communication with Friends and Family:     Frequency of Social Gatherings with Friends and Family:     Attends Pentecostalism Services:     Active Member of Clubs or Organizations:     Attends Club or Organization Meetings:     Marital Status:    Intimate Partner Violence:     Fear of Current or Ex-Partner:     Emotionally Abused:     Physically Abused:     Sexually Abused:       Medications and Allergies:     Current Outpatient Medications   Medication Sig Dispense Refill    amLODIPine (NORVASC) 5 mg tablet TAKE 1 TABLET DAILY 90 tablet 3    aspirin (ECOTRIN LOW STRENGTH) 81 mg EC tablet Take 81 mg by mouth daily      metoprolol succinate (TOPROL-XL) 25 mg 24 hr tablet TAKE 1 TABLET DAILY 90 tablet 3    simvastatin (ZOCOR) 20 mg tablet TAKE 1 TABLET DAILY 90 tablet 3    terazosin (HYTRIN) 2 mg capsule Take 1 capsule (2 mg total) by mouth daily 90 capsule 1     No current facility-administered medications for this visit       No Known Allergies   Immunizations:     Immunization History   Administered Date(s) Administered    INFLUENZA 10/15/2014, 10/27/2016, 10/30/2017, 10/02/2018, 10/17/2019, 10/09/2020    Influenza Quadrivalent, 6-35 Months IM 10/27/2016    Influenza Split 10/05/2009    Influenza Split High Dose Preservative Free IM 10/11/2017    Influenza, injectable, quadrivalent, preservative free 0 5 mL 10/09/2020    Influenza, seasonal, injectable 10/15/2014, 10/15/2015    Pneumococcal Conjugate 13-Valent 12/13/2017    Pneumococcal Polysaccharide PPV23 10/01/2016, 03/12/2019    SARS-CoV-2 / COVID-19 mRNA IM (Pfizer-BioNTech) 02/17/2021, 03/10/2021    Tdap 08/08/2018    Zoster Vaccine Recombinant 10/09/2018, 01/28/2020      Health Maintenance:         Topic Date Due    Colorectal Cancer Screening  03/20/2025    Hepatitis C Screening  Completed         Topic Date Due    Influenza Vaccine (1) 09/01/2021      Medicare Health Risk Assessment:     There were no vitals taken for this visit  Cristobal Villarreal is here for his Subsequent Wellness visit  Health Risk Assessment:   Patient rates overall health as good  Patient feels that their physical health rating is same  Patient is satisfied with their life  Eyesight was rated as same  Hearing was rated as same  Patient feels that their emotional and mental health rating is same  Patients states they are sometimes angry  Patient states they are sometimes unusually tired/fatigued  Pain experienced in the last 7 days has been some  Patient's pain rating has been 6/10  Patient states that he has experienced weight loss or gain in last 6 months  Depression Screening:   PHQ-2 Score: 1      Fall Risk Screening: In the past year, patient has experienced: history of falling in past year    Injured during fall?: No    Feels unsteady when standing or walking?: No    Worried about falling?: Yes      Home Safety:  Patient does not have trouble with stairs inside or outside of their home  Patient has working smoke alarms and has working carbon monoxide detector  Home safety hazards include: none  Nutrition:   Current diet is Regular  Medications:   Patient is currently taking over-the-counter supplements  OTC medications include: see medication list  Patient is able to manage medications  Activities of Daily Living (ADLs)/Instrumental Activities of Daily Living (IADLs):   Walk and transfer into and out of bed and chair?: Yes  Dress and groom yourself?: Yes    Bathe or shower yourself?: Yes    Feed yourself?  Yes  Do your laundry/housekeeping?: Yes  Manage your money, pay your bills and track your expenses?: Yes  Make your own meals?: Yes    Do your own shopping?: Yes    Durable Medical Equipment Suppliers  no preference    Previous Hospitalizations:   Any hospitalizations or ED visits within the last 12 months?: No      Advance Care Planning:   Living will: Yes    Durable POA for healthcare: No    Advanced directive: Yes    Advanced directive counseling given: No    Five wishes given: No    Patient declined ACP directive: Yes    End of Life Decisions reviewed with patient: Yes    Provider agrees with end of life decisions: Yes      Cognitive Screening:   Provider or family/friend/caregiver concerned regarding cognition?: No    PREVENTIVE SCREENINGS      Cardiovascular Screening:    General: Screening Not Indicated and History Lipid Disorder      Diabetes Screening:     General: Screening Current      Colorectal Cancer Screening:     General: Screening Current      Prostate Cancer Screening:      Due for: PSA      Osteoporosis Screening:    General: Screening Not Indicated      Abdominal Aortic Aneurysm (AAA) Screening:    Risk factors include: age between 73-69 yo        Lung Cancer Screening:     General: Screening Not Indicated      Hepatitis C Screening:    General: Screening Current    Screening, Brief Intervention, and Referral to Treatment (SBIRT)    Screening  Typical number of drinks in a day: 0  Typical number of drinks in a week: 0  Interpretation: Low risk drinking behavior      Single Item Drug Screening:  How often have you used an illegal drug (including marijuana) or a prescription medication for non-medical reasons in the past year? never    Single Item Drug Screen Score: 0  Interpretation: Negative screen for possible drug use disorder      Beatrice Soto MD

## 2021-09-20 NOTE — PROGRESS NOTES
Assessment/Plan:    Benign essential hypertension  Blood pressure is nicely controlled on current medications  Gilbert's syndrome  Clinically stable and unchanged  Total bilirubin 1 04    Medicare annual wellness visit, subsequent  No issues  Patient is up-to-date for age related screenings and immunizations  PSA to be done for next visit    Nocturia  Twice nightly  No bladder discomfort, urgency or dysuria  Follow-up PSA ordered  KARSON next visit  Screening for prostate cancer  PSA and KARSON in 6 months    Sjogren's syndrome ([address] )  Continues with use of carboxymethylcellulose drops to each eye    Thrombocytopenia (HCC)  Platelet count stable, greater than 100 K  No symptoms of easy bruisability        Diagnoses and all orders for this visit:    Benign essential hypertension  -     CBC and Platelet; Future  -     Comprehensive metabolic panel; Future    Thrombocytopenia (HCC)  -     CBC and Platelet; Future    Medicare annual wellness visit, subsequent  -     PSA, Total Screen; Future  -     CBC and Platelet; Future  -     Comprehensive metabolic panel; Future  -     Lipid panel; Future    Gilbert's syndrome  -     Comprehensive metabolic panel; Future    Screening for prostate cancer  -     PSA, Total Screen; Future    Sjogren's syndrome with keratoconjunctivitis sicca (HCC)    Nocturia    Other orders  -     carboxymethylcellulose (REFRESH PLUS) 0 5 % SOLN; INSTILL 1 DROP BOTH EYES THREE TIMES A DAY FOR DRY EYES          Subjective:      Patient ID: Tameka Mendiola is a 72 y o  male  Patient presents for follow-up visit along with Medicare wellness visit  He is doing well  In general he has no complaints other than nocturia x2 and occasional feeling of incomplete bladder emptying  His most recent PSA was 2 3 which has been in line with previous testing  He has no complaints of pain  Appetite is good but patient states he does not eat much in general because he feels a full    He denies any issues sleeping  He does have a history of thrombocytopenia but denies any excessive bruising or bleeding tendencies  No history of fevers or night sweats  Weight has been stable  He does complain of dry eyes and continues to use carboxymethylcellulose solution which is effective in relieving his symptoms  Labs are reviewed  CMP is remarkable only for mildly elevated total bilirubin level of 1 04 and for a mildly increased total protein level of 8 3  CBC is remarkable only for decreased platelet count  Of 189,009  Family History   Problem Relation Age of Onset    Hypertension Mother         benign essential     Social History     Socioeconomic History    Marital status: /Civil Union     Spouse name: Not on file    Number of children: Not on file    Years of education: Not on file    Highest education level: Not on file   Occupational History    Not on file   Tobacco Use    Smoking status: Never Smoker    Smokeless tobacco: Never Used   Vaping Use    Vaping Use: Never used   Substance and Sexual Activity    Alcohol use: No    Drug use: No    Sexual activity: Yes   Other Topics Concern    Not on file   Social History Narrative    Not on file     Social Determinants of Health     Financial Resource Strain:     Difficulty of Paying Living Expenses:    Food Insecurity:     Worried About Running Out of Food in the Last Year:     [address] in the Last Year:    Transportation Needs:     Lack of Transportation (Medical):      Lack of Transportation (Non-Medical):    Physical Activity:     Days of Exercise per Week:     Minutes of Exercise per Session:    Stress:     Feeling of Stress :    Social Connections:     Frequency of Communication with Friends and Family:     Frequency of Social Gatherings with Friends and Family:     Attends [employer] Services:     Active Member of Clubs or Organizations:     Attends Club or Organization Meetings:     Marital Status:    Intimate Partner Violence:     Fear of Current or Ex-Partner:     Emotionally Abused:     Physically Abused:     Sexually Abused:      Past Medical History:   Diagnosis Date    Hypercholesterolemia     Hypertension        Current Outpatient Medications:     amLODIPine (NORVASC) 5 mg tablet, TAKE 1 TABLET DAILY, Disp: 90 tablet, Rfl: 3    aspirin (ECOTRIN LOW STRENGTH) 81 mg EC tablet, Take 81 mg by mouth daily, Disp: , Rfl:     carboxymethylcellulose (REFRESH PLUS) 0 5 % SOLN, INSTILL 1 DROP BOTH EYES THREE TIMES A DAY FOR DRY EYES, Disp: , Rfl:     metoprolol succinate (TOPROL-XL) 25 mg 24 hr tablet, TAKE 1 TABLET DAILY, Disp: 90 tablet, Rfl: 3    simvastatin (ZOCOR) 20 mg tablet, TAKE 1 TABLET DAILY, Disp: 90 tablet, Rfl: 3    terazosin (HYTRIN) 2 mg capsule, Take 1 capsule (2 mg total) by mouth daily, Disp: 90 capsule, Rfl: 1  No Known Allergies  Past Surgical History:   Procedure Laterality Date    HERNIA REPAIR           Review of Systems   Constitutional: Negative  HENT: Negative  Eyes: Negative  Respiratory: Negative  Cardiovascular: Negative  Gastrointestinal: Negative  Endocrine: Negative  Genitourinary:        Nocturia x2   Musculoskeletal: Negative  Skin: Negative  Allergic/Immunologic: Negative  Neurological: Negative  Hematological: Negative  Psychiatric/Behavioral: Negative  Objective:      /76   Pulse 68   Temp 98 6 °F (37 °C) (Tympanic)   Ht 5' 4 17" (1 63 m)   Wt 71 6 kg (157 lb 12 8 oz)   SpO2 98%   BMI 26 94 kg/m²  BMI Counseling: Body mass index is 26 94 kg/m²  The BMI is above normal  Nutrition recommendations include decreasing overall calorie intake, 3-5 servings of fruits/vegetables daily, moderation in carbohydrate intake, increasing intake of lean protein, reducing intake of saturated fat and trans fat and reducing intake of cholesterol  Exercise recommendations include exercising 3-5 times per week           Physical Exam  Vitals reviewed  Constitutional:       General: He is not in acute distress  Appearance: Normal appearance  He is normal weight  He is not ill-appearing, toxic-appearing or diaphoretic  HENT:      Head: Normocephalic and atraumatic  Right Ear: External ear normal       Left Ear: External ear normal    Eyes:      General: No scleral icterus  Conjunctiva/sclera: Conjunctivae normal       Pupils: Pupils are equal, round, and reactive to light  Neck:      Vascular: No carotid bruit  Cardiovascular:      Rate and Rhythm: Normal rate and regular rhythm  Pulses: Normal pulses  Heart sounds: Normal heart sounds  No murmur heard  Pulmonary:      Effort: Pulmonary effort is normal  No respiratory distress  Abdominal:      General: Abdomen is flat  Bowel sounds are normal  There is no distension  Tenderness: There is no abdominal tenderness  Musculoskeletal:      Cervical back: Neck supple  No rigidity or tenderness  Right lower leg: No edema  Left lower leg: No edema  Lymphadenopathy:      Cervical: No cervical adenopathy  Skin:     General: Skin is warm  Capillary Refill: Capillary refill takes less than 2 seconds  Coloration: Skin is not jaundiced  Findings: No bruising, erythema or rash  Neurological:      General: No focal deficit present  Mental Status: He is alert and oriented to person, place, and time  Mental status is at baseline  Psychiatric:         Mood and Affect: Mood normal          Behavior: Behavior normal          Thought Content:  Thought content normal          Judgment: Judgment normal

## 2021-09-20 NOTE — ASSESSMENT & PLAN NOTE
No issues  Patient is up-to-date for age related screenings and immunizations    PSA to be done for next visit

## 2021-09-20 NOTE — PROGRESS NOTES
BMI Counseling: There is no height or weight on file to calculate BMI   The BMI is above normal  {Above Normal BMI; plan:[id number]}

## 2021-10-23 ENCOUNTER — IMMUNIZATIONS (OUTPATIENT)
Dept: FAMILY MEDICINE CLINIC | Facility: HOSPITAL | Age: 71
End: 2021-10-23

## 2021-10-23 DIAGNOSIS — Z23 ENCOUNTER FOR IMMUNIZATION: Primary | ICD-10-CM

## 2021-10-23 PROCEDURE — 91300 COVID-19 PFIZER VACC 0.3 ML: CPT

## 2021-10-23 PROCEDURE — 0001A COVID-19 PFIZER VACC 0.3 ML: CPT

## 2022-02-28 ENCOUNTER — TELEPHONE (OUTPATIENT)
Dept: INTERNAL MEDICINE CLINIC | Facility: CLINIC | Age: 72
End: 2022-02-28

## 2022-02-28 DIAGNOSIS — I10 BENIGN ESSENTIAL HYPERTENSION: ICD-10-CM

## 2022-02-28 DIAGNOSIS — I10 BENIGN HYPERTENSION: ICD-10-CM

## 2022-02-28 DIAGNOSIS — E78.00 HYPERCHOLESTEROLEMIA: ICD-10-CM

## 2022-02-28 RX ORDER — AMLODIPINE BESYLATE 5 MG/1
5 TABLET ORAL DAILY
Qty: 90 TABLET | Refills: 1 | Status: SHIPPED | OUTPATIENT
Start: 2022-02-28 | End: 2022-03-21 | Stop reason: SDUPTHER

## 2022-02-28 RX ORDER — SIMVASTATIN 20 MG
20 TABLET ORAL DAILY
Qty: 90 TABLET | Refills: 1 | Status: SHIPPED | OUTPATIENT
Start: 2022-02-28

## 2022-02-28 RX ORDER — TERAZOSIN 2 MG/1
2 CAPSULE ORAL DAILY
Qty: 90 CAPSULE | Refills: 1 | Status: SHIPPED | OUTPATIENT
Start: 2022-02-28

## 2022-02-28 RX ORDER — METOPROLOL SUCCINATE 25 MG/1
25 TABLET, EXTENDED RELEASE ORAL DAILY
Qty: 90 TABLET | Refills: 1 | Status: SHIPPED | OUTPATIENT
Start: 2022-02-28

## 2022-02-28 NOTE — TELEPHONE ENCOUNTER
Pt would like refill of simvastatin 20 mg, terazosin 2 mg, metoprolol succinate 25 mg and amLODIPine 5 mg       [id number]  [id number] [id number]

## 2022-03-11 ENCOUNTER — RA CDI HCC (OUTPATIENT)
Dept: OTHER | Facility: HOSPITAL | Age: 72
End: 2022-03-11

## 2022-03-12 ENCOUNTER — APPOINTMENT (OUTPATIENT)
Dept: LAB | Age: 72
End: 2022-03-12
Payer: MEDICARE

## 2022-03-12 DIAGNOSIS — D69.6 THROMBOCYTOPENIA (HCC): ICD-10-CM

## 2022-03-12 DIAGNOSIS — Z00.00 MEDICARE ANNUAL WELLNESS VISIT, SUBSEQUENT: ICD-10-CM

## 2022-03-12 DIAGNOSIS — Z12.5 SCREENING FOR PROSTATE CANCER: ICD-10-CM

## 2022-03-12 DIAGNOSIS — E80.4 GILBERT'S SYNDROME: ICD-10-CM

## 2022-03-12 DIAGNOSIS — I10 BENIGN ESSENTIAL HYPERTENSION: ICD-10-CM

## 2022-03-12 LAB
ALBUMIN SERPL BCP-MCNC: 4.2 G/DL (ref 3.5–5)
ALP SERPL-CCNC: 76 U/L (ref 46–116)
ALT SERPL W P-5'-P-CCNC: 37 U/L (ref 12–78)
ANION GAP SERPL CALCULATED.3IONS-SCNC: 3 MMOL/L (ref 4–13)
AST SERPL W P-5'-P-CCNC: 23 U/L (ref 5–45)
BILIRUB SERPL-MCNC: 0.85 MG/DL (ref 0.2–1)
BUN SERPL-MCNC: 9 MG/DL (ref 5–25)
CALCIUM SERPL-MCNC: 9.4 MG/DL (ref 8.3–10.1)
CHLORIDE SERPL-SCNC: 108 MMOL/L (ref 100–108)
CHOLEST SERPL-MCNC: 117 MG/DL
CO2 SERPL-SCNC: 31 MMOL/L (ref 21–32)
CREAT SERPL-MCNC: 1.04 MG/DL (ref 0.6–1.3)
ERYTHROCYTE [DISTWIDTH] IN BLOOD BY AUTOMATED COUNT: 12.6 % (ref 11.6–15.1)
GFR SERPL CREATININE-BSD FRML MDRD: 72 ML/MIN/1.73SQ M
GLUCOSE P FAST SERPL-MCNC: 97 MG/DL (ref 65–99)
HCT VFR BLD AUTO: 45.1 % (ref 36.5–49.3)
HDLC SERPL-MCNC: 36 MG/DL
HGB BLD-MCNC: 15.2 G/DL (ref 12–17)
LDLC SERPL CALC-MCNC: 62 MG/DL (ref 0–100)
MCH RBC QN AUTO: 29.5 PG (ref 26.8–34.3)
MCHC RBC AUTO-ENTMCNC: 33.7 G/DL (ref 31.4–37.4)
MCV RBC AUTO: 88 FL (ref 82–98)
NONHDLC SERPL-MCNC: 81 MG/DL
PLATELET # BLD AUTO: 127 THOUSANDS/UL (ref 149–390)
PMV BLD AUTO: 11.6 FL (ref 8.9–12.7)
POTASSIUM SERPL-SCNC: 4.5 MMOL/L (ref 3.5–5.3)
PROT SERPL-MCNC: 7.9 G/DL (ref 6.4–8.2)
PSA SERPL-MCNC: 2 NG/ML (ref 0–4)
RBC # BLD AUTO: 5.15 MILLION/UL (ref 3.88–5.62)
SODIUM SERPL-SCNC: 142 MMOL/L (ref 136–145)
TRIGL SERPL-MCNC: 96 MG/DL
WBC # BLD AUTO: 3.75 THOUSAND/UL (ref 4.31–10.16)

## 2022-03-12 PROCEDURE — 80053 COMPREHEN METABOLIC PANEL: CPT

## 2022-03-12 PROCEDURE — G0103 PSA SCREENING: HCPCS

## 2022-03-12 PROCEDURE — 36415 COLL VENOUS BLD VENIPUNCTURE: CPT

## 2022-03-12 PROCEDURE — 85027 COMPLETE CBC AUTOMATED: CPT

## 2022-03-12 PROCEDURE — 80061 LIPID PANEL: CPT

## 2022-03-21 ENCOUNTER — OFFICE VISIT (OUTPATIENT)
Dept: INTERNAL MEDICINE CLINIC | Facility: CLINIC | Age: 72
End: 2022-03-21
Payer: MEDICARE

## 2022-03-21 VITALS
TEMPERATURE: 97.9 F | OXYGEN SATURATION: 97 % | DIASTOLIC BLOOD PRESSURE: 88 MMHG | WEIGHT: 162.8 LBS | HEART RATE: 75 BPM | SYSTOLIC BLOOD PRESSURE: 160 MMHG | BODY MASS INDEX: 27.12 KG/M2 | HEIGHT: 65 IN

## 2022-03-21 DIAGNOSIS — D69.6 THROMBOCYTOPENIA (HCC): ICD-10-CM

## 2022-03-21 DIAGNOSIS — M35.01 SJOGREN'S SYNDROME WITH KERATOCONJUNCTIVITIS SICCA (HCC): ICD-10-CM

## 2022-03-21 DIAGNOSIS — I10 BENIGN HYPERTENSION: ICD-10-CM

## 2022-03-21 DIAGNOSIS — I10 BENIGN ESSENTIAL HYPERTENSION: Primary | ICD-10-CM

## 2022-03-21 DIAGNOSIS — J30.2 SEASONAL ALLERGIES: ICD-10-CM

## 2022-03-21 DIAGNOSIS — E78.00 HYPERCHOLESTEROLEMIA: ICD-10-CM

## 2022-03-21 PROCEDURE — 99214 OFFICE O/P EST MOD 30 MIN: CPT | Performed by: INTERNAL MEDICINE

## 2022-03-21 RX ORDER — AMLODIPINE BESYLATE 10 MG/1
5 TABLET ORAL DAILY
Qty: 90 TABLET | Refills: 1 | Status: SHIPPED | OUTPATIENT
Start: 2022-03-21 | End: 2022-03-24 | Stop reason: SDUPTHER

## 2022-03-21 RX ORDER — FLUTICASONE PROPIONATE 50 MCG
1 SPRAY, SUSPENSION (ML) NASAL DAILY
Qty: 48 G | Refills: 1 | Status: SHIPPED | OUTPATIENT
Start: 2022-03-21

## 2022-03-21 NOTE — PROGRESS NOTES
Assessment/Plan:    Benign essential hypertension  Blood pressure is mildly increased  On recheck 160/80  Will increase amlodipine to 10 mg    Hypercholesterolemia  Lipids are at goal levels    Seasonal allergies  Patient was given a prescription for Flonase nasal spray to use daily as needed    Sjogren's syndrome ([address] )  Continues with carboxymethylcellulose solution to both eyes with good response    Thrombocytopenia (HCC)  With mild immune thrombocytopenia without any bleeding tendencies  Diagnoses and all orders for this visit:    Benign essential hypertension  -     CBC and differential; Future    Hypercholesterolemia  -     Lipid panel; Future    Thrombocytopenia (HCC)  -     CBC and differential; Future    Benign hypertension  -     amLODIPine (NORVASC) 10 mg tablet; Take 0 5 tablets (5 mg total) by mouth daily  -     Comprehensive metabolic panel; Future    Sjogren's syndrome with keratoconjunctivitis sicca (HCC)  -     CBC and differential; Future  -     Comprehensive metabolic panel; Future    Seasonal allergies  -     fluticasone (FLONASE) 50 mcg/act nasal spray; 1 spray into each nostril daily  -     CBC and differential; Future  -     Comprehensive metabolic panel; Future          Subjective:      Patient ID: Josh Hernández is a 80 y o  male  Patient presents to the office for 6 month follow-up  In general he has no particular complaints  He is being followed by the [address] [place] [address] for Sjogren syndrome  He is utilizing carboxymethylcellulose drops with good response for his dry eyes  He has a history of hypertension has no complaints of any chest discomfort, dyspnea, headaches, blurred vision, dizziness, lightheadedness, numbness or weakness  History of dyslipidemia  Lipids are at goal   HDL so slightly low at 36, all other values are at goal   He has a history of seasonal allergies    With his history of hypertension he has been instructed not to take over-the-counter allergy medications and we will prescribe him a steroid based nasal spray  CBC disclose some mild thrombocytopenia 127 1000  Patient denies any bleeding tendencies  Blood pressure is noted to be elevated        Family History   Problem Relation Age of Onset    Hypertension Mother         benign essential     Social History     Socioeconomic History    Marital status: /Civil Union     Spouse name: Not on file    Number of children: Not on file    Years of education: Not on file    Highest education level: Not on file   Occupational History    Not on file   Tobacco Use    Smoking status: Never Smoker    Smokeless tobacco: Never Used   Vaping Use    Vaping Use: Never used   Substance and Sexual Activity    Alcohol use: No    Drug use: No    Sexual activity: Yes   Other Topics Concern    Not on file   Social History Narrative    Not on file     Social Determinants of Health     Financial Resource Strain: Not on file   Food Insecurity: Not on file   Transportation Needs: Not on file   Physical Activity: Not on file   Stress: Not on file   Social Connections: Not on file   Intimate Partner Violence: Not on file   Housing Stability: Not on file     Past Medical History:   Diagnosis Date    Hypercholesterolemia     Hypertension        Current Outpatient Medications:     amLODIPine (NORVASC) 10 mg tablet, Take 0 5 tablets (5 mg total) by mouth daily, Disp: 90 tablet, Rfl: 1    carboxymethylcellulose (REFRESH PLUS) 0 5 % SOLN, INSTILL 1 DROP BOTH EYES THREE TIMES A DAY FOR DRY EYES, Disp: , Rfl:     metoprolol succinate (TOPROL-XL) 25 mg 24 hr tablet, Take 1 tablet (25 mg total) by mouth daily, Disp: 90 tablet, Rfl: 1    simvastatin (ZOCOR) 20 mg tablet, Take 1 tablet (20 mg total) by mouth daily, Disp: 90 tablet, Rfl: 1    terazosin (HYTRIN) 2 mg capsule, Take 1 capsule (2 mg total) by mouth daily, Disp: 90 capsule, Rfl: 1    aspirin (ECOTRIN LOW STRENGTH) 81 mg EC tablet, Take 81 mg by mouth daily (Patient not taking: Reported on 3/21/2022 ), Disp: , Rfl:     fluticasone (FLONASE) 50 mcg/act nasal spray, 1 spray into each nostril daily, Disp: 48 g, Rfl: 1  No Known Allergies  Past Surgical History:   Procedure Laterality Date    HERNIA REPAIR           Review of Systems   Constitutional: Negative  Negative for activity change, appetite change, chills, diaphoresis, fatigue, fever and unexpected weight change  HENT: Negative  Eyes: Negative  Respiratory: Negative  Cardiovascular: Negative  Gastrointestinal: Negative  Endocrine: Negative  Genitourinary: Negative  Musculoskeletal: Negative  Skin: Negative  Neurological: Negative  Hematological: Negative  Psychiatric/Behavioral: The patient is not nervous/anxious  Objective:      /88 (BP Location: Left arm, Patient Position: Sitting, Cuff Size: Standard)   Pulse 75   Temp 97 9 °F (36 6 °C) (Tympanic)   Ht 5' 5 12" (1 654 m)   Wt 73 8 kg (162 lb 12 8 oz)   SpO2 97% Comment: room air  BMI 26 99 kg/m²          Physical Exam  Vitals reviewed  Constitutional:       General: He is not in acute distress  Appearance: Normal appearance  He is normal weight  He is not ill-appearing, toxic-appearing or diaphoretic  HENT:      Head: Normocephalic and atraumatic  Right Ear: External ear normal       Left Ear: External ear normal       Nose: Nose normal    Eyes:      General: No scleral icterus  Conjunctiva/sclera: Conjunctivae normal       Pupils: Pupils are equal, round, and reactive to light  Neck:      Vascular: No carotid bruit or JVD  Trachea: No tracheal deviation  Cardiovascular:      Rate and Rhythm: Normal rate and regular rhythm  Pulses: Normal pulses  Heart sounds: Normal heart sounds  No murmur heard  Pulmonary:      Effort: Pulmonary effort is normal  No respiratory distress  Breath sounds: Normal breath sounds  No rales  Abdominal:      General: Abdomen is flat   There is no distension  Musculoskeletal:         General: No swelling  Cervical back: Neck supple  Right lower leg: No edema  Left lower leg: No edema  Skin:     General: Skin is warm  Coloration: Skin is not jaundiced  Findings: No bruising, erythema or rash  Neurological:      General: No focal deficit present  Mental Status: He is alert and oriented to person, place, and time  Mental status is at baseline     Psychiatric:         Mood and Affect: Mood normal          Behavior: Behavior normal

## 2022-03-24 ENCOUNTER — TELEPHONE (OUTPATIENT)
Dept: INTERNAL MEDICINE CLINIC | Facility: CLINIC | Age: 72
End: 2022-03-24

## 2022-03-24 DIAGNOSIS — I10 BENIGN HYPERTENSION: ICD-10-CM

## 2022-03-24 RX ORDER — AMLODIPINE BESYLATE 10 MG/1
10 TABLET ORAL DAILY
Qty: 90 TABLET | Refills: 1 | Status: SHIPPED | OUTPATIENT
Start: 2022-03-24

## 2022-03-24 NOTE — TELEPHONE ENCOUNTER
Please have verification for patient spouse    [phone] Patient needs clarification on Amlodipine that they received from Mail order today    Amlodipine 10 mg says to take 0 5 tablet daily    Patients wife says he was to increase to 10 mg

## 2022-03-24 NOTE — TELEPHONE ENCOUNTER
Last ov 3/21/22 - Amlodipine was increased to 10 mg daily  RX sent incorrectly and patient received 45 tabs for a 90 day supply  Corrected RX sent today to [facility]

## 2022-08-22 DIAGNOSIS — I10 BENIGN HYPERTENSION: ICD-10-CM

## 2022-08-22 DIAGNOSIS — I10 BENIGN ESSENTIAL HYPERTENSION: ICD-10-CM

## 2022-08-22 DIAGNOSIS — E78.00 HYPERCHOLESTEROLEMIA: ICD-10-CM

## 2022-08-22 RX ORDER — METOPROLOL SUCCINATE 25 MG/1
25 TABLET, EXTENDED RELEASE ORAL DAILY
Qty: 90 TABLET | Refills: 1 | Status: SHIPPED | OUTPATIENT
Start: 2022-08-22

## 2022-08-22 RX ORDER — TERAZOSIN 2 MG/1
2 CAPSULE ORAL DAILY
Qty: 90 CAPSULE | Refills: 1 | Status: SHIPPED | OUTPATIENT
Start: 2022-08-22

## 2022-08-22 RX ORDER — SIMVASTATIN 20 MG
20 TABLET ORAL DAILY
Qty: 90 TABLET | Refills: 1 | Status: SHIPPED | OUTPATIENT
Start: 2022-08-22

## 2022-08-22 RX ORDER — AMLODIPINE BESYLATE 10 MG/1
10 TABLET ORAL DAILY
Qty: 90 TABLET | Refills: 1 | Status: SHIPPED | OUTPATIENT
Start: 2022-08-22

## 2022-09-29 ENCOUNTER — APPOINTMENT (OUTPATIENT)
Dept: LAB | Age: 72
End: 2022-09-29
Payer: MEDICARE

## 2022-09-29 DIAGNOSIS — E78.00 HYPERCHOLESTEROLEMIA: ICD-10-CM

## 2022-09-29 DIAGNOSIS — I10 BENIGN HYPERTENSION: ICD-10-CM

## 2022-09-29 DIAGNOSIS — J30.2 SEASONAL ALLERGIES: ICD-10-CM

## 2022-09-29 DIAGNOSIS — D69.6 THROMBOCYTOPENIA (HCC): ICD-10-CM

## 2022-09-29 DIAGNOSIS — M35.01 SJOGREN'S SYNDROME WITH KERATOCONJUNCTIVITIS SICCA (HCC): ICD-10-CM

## 2022-09-29 DIAGNOSIS — I10 BENIGN ESSENTIAL HYPERTENSION: ICD-10-CM

## 2022-09-29 LAB
ALBUMIN SERPL BCP-MCNC: 4.4 G/DL (ref 3.5–5)
ALP SERPL-CCNC: 71 U/L (ref 46–116)
ALT SERPL W P-5'-P-CCNC: 36 U/L (ref 12–78)
ANION GAP SERPL CALCULATED.3IONS-SCNC: 5 MMOL/L (ref 4–13)
AST SERPL W P-5'-P-CCNC: 24 U/L (ref 5–45)
BASOPHILS # BLD AUTO: 0.05 THOUSANDS/ΜL (ref 0–0.1)
BASOPHILS NFR BLD AUTO: 1 % (ref 0–1)
BILIRUB SERPL-MCNC: 1.02 MG/DL (ref 0.2–1)
BUN SERPL-MCNC: 10 MG/DL (ref 5–25)
CALCIUM SERPL-MCNC: 8.9 MG/DL (ref 8.3–10.1)
CHLORIDE SERPL-SCNC: 108 MMOL/L (ref 96–108)
CHOLEST SERPL-MCNC: 128 MG/DL
CO2 SERPL-SCNC: 28 MMOL/L (ref 21–32)
CREAT SERPL-MCNC: 1.03 MG/DL (ref 0.6–1.3)
EOSINOPHIL # BLD AUTO: 0.2 THOUSAND/ΜL (ref 0–0.61)
EOSINOPHIL NFR BLD AUTO: 4 % (ref 0–6)
ERYTHROCYTE [DISTWIDTH] IN BLOOD BY AUTOMATED COUNT: 13.1 % (ref 11.6–15.1)
GFR SERPL CREATININE-BSD FRML MDRD: 73 ML/MIN/1.73SQ M
GLUCOSE P FAST SERPL-MCNC: 86 MG/DL (ref 65–99)
HCT VFR BLD AUTO: 46.9 % (ref 36.5–49.3)
HDLC SERPL-MCNC: 38 MG/DL
HGB BLD-MCNC: 15 G/DL (ref 12–17)
IMM GRANULOCYTES # BLD AUTO: 0 THOUSAND/UL (ref 0–0.2)
IMM GRANULOCYTES NFR BLD AUTO: 0 % (ref 0–2)
LDLC SERPL CALC-MCNC: 71 MG/DL (ref 0–100)
LYMPHOCYTES # BLD AUTO: 1.79 THOUSANDS/ΜL (ref 0.6–4.47)
LYMPHOCYTES NFR BLD AUTO: 37 % (ref 14–44)
MCH RBC QN AUTO: 28.7 PG (ref 26.8–34.3)
MCHC RBC AUTO-ENTMCNC: 32 G/DL (ref 31.4–37.4)
MCV RBC AUTO: 90 FL (ref 82–98)
MONOCYTES # BLD AUTO: 0.35 THOUSAND/ΜL (ref 0.17–1.22)
MONOCYTES NFR BLD AUTO: 7 % (ref 4–12)
NEUTROPHILS # BLD AUTO: 2.52 THOUSANDS/ΜL (ref 1.85–7.62)
NEUTS SEG NFR BLD AUTO: 51 % (ref 43–75)
NONHDLC SERPL-MCNC: 90 MG/DL
NRBC BLD AUTO-RTO: 0 /100 WBCS
PLATELET # BLD AUTO: 139 THOUSANDS/UL (ref 149–390)
PMV BLD AUTO: 11.9 FL (ref 8.9–12.7)
POTASSIUM SERPL-SCNC: 4.4 MMOL/L (ref 3.5–5.3)
PROT SERPL-MCNC: 8.6 G/DL (ref 6.4–8.4)
RBC # BLD AUTO: 5.22 MILLION/UL (ref 3.88–5.62)
SODIUM SERPL-SCNC: 141 MMOL/L (ref 135–147)
TRIGL SERPL-MCNC: 97 MG/DL
WBC # BLD AUTO: 4.91 THOUSAND/UL (ref 4.31–10.16)

## 2022-09-29 PROCEDURE — 80061 LIPID PANEL: CPT

## 2022-09-29 PROCEDURE — 85025 COMPLETE CBC W/AUTO DIFF WBC: CPT

## 2022-09-29 PROCEDURE — 80053 COMPREHEN METABOLIC PANEL: CPT

## 2022-09-29 PROCEDURE — 36415 COLL VENOUS BLD VENIPUNCTURE: CPT

## 2022-10-05 ENCOUNTER — OFFICE VISIT (OUTPATIENT)
Dept: INTERNAL MEDICINE CLINIC | Facility: CLINIC | Age: 72
End: 2022-10-05
Payer: MEDICARE

## 2022-10-05 VITALS
HEIGHT: 65 IN | TEMPERATURE: 97.3 F | SYSTOLIC BLOOD PRESSURE: 134 MMHG | DIASTOLIC BLOOD PRESSURE: 82 MMHG | HEART RATE: 65 BPM | OXYGEN SATURATION: 97 % | WEIGHT: 156.8 LBS | BODY MASS INDEX: 26.12 KG/M2

## 2022-10-05 DIAGNOSIS — E78.00 HYPERCHOLESTEROLEMIA: ICD-10-CM

## 2022-10-05 DIAGNOSIS — I10 BENIGN ESSENTIAL HYPERTENSION: ICD-10-CM

## 2022-10-05 DIAGNOSIS — Z12.5 SCREENING FOR PROSTATE CANCER: ICD-10-CM

## 2022-10-05 DIAGNOSIS — Z23 NEED FOR INFLUENZA VACCINATION: ICD-10-CM

## 2022-10-05 DIAGNOSIS — Z00.00 MEDICARE ANNUAL WELLNESS VISIT, SUBSEQUENT: ICD-10-CM

## 2022-10-05 DIAGNOSIS — D69.6 THROMBOCYTOPENIA (HCC): ICD-10-CM

## 2022-10-05 DIAGNOSIS — E80.4 GILBERT'S SYNDROME: Primary | ICD-10-CM

## 2022-10-05 DIAGNOSIS — J30.2 SEASONAL ALLERGIES: ICD-10-CM

## 2022-10-05 PROCEDURE — 99214 OFFICE O/P EST MOD 30 MIN: CPT | Performed by: INTERNAL MEDICINE

## 2022-10-05 PROCEDURE — 90662 IIV NO PRSV INCREASED AG IM: CPT

## 2022-10-05 PROCEDURE — G0439 PPPS, SUBSEQ VISIT: HCPCS | Performed by: INTERNAL MEDICINE

## 2022-10-05 PROCEDURE — G0008 ADMIN INFLUENZA VIRUS VAC: HCPCS

## 2022-10-05 NOTE — ASSESSMENT & PLAN NOTE
Platelets have historically been on the low side of normal without any increased bleeding tendencies

## 2022-10-05 NOTE — ASSESSMENT & PLAN NOTE
Lipids are near goal levels  Total cholesterol 128  Triglycerides are normal   HDL cholesterol is low at LDL cholesterol is 71

## 2022-10-05 NOTE — PROGRESS NOTES
Assessment and Plan:     Problem List Items Addressed This Visit        Cardiovascular and Mediastinum    Benign essential hypertension     Blood pressure is stable and well controlled on current medications  Relevant Orders    CBC and differential    Comprehensive metabolic panel       Hematopoietic and Hemostatic    Thrombocytopenia (HCC)     Platelets have historically been on the low side of normal without any increased bleeding tendencies  Relevant Orders    CBC and differential       Other    Hypercholesterolemia     Lipids are near goal levels  Total cholesterol 128  Triglycerides are normal   HDL cholesterol is low at LDL cholesterol is 71  Relevant Orders    Comprehensive metabolic panel    Lipid panel    Screening for prostate cancer     PSA ordered for spring of 2023         Relevant Orders    PSA, Total Screen    Medicare annual wellness visit, subsequent     Patient is up-to-date on age-appropriate screenings and immunizations  Gilbert's syndrome - Primary     Asymptomatic  Total bilirubin levels have fluctuated between 0 84 and 1 04         Relevant Orders    Comprehensive metabolic panel    Seasonal allergies     Patient was provided with a prescription for Drixoral 1 tablet every 12 hours as needed         Relevant Medications    dexbrompheniramine-pseudoephedrine (DRIXORAL) 6-120 MG per tablet      Other Visit Diagnoses     Need for influenza vaccination        Relevant Orders    influenza vaccine, high-dose, PF 0 7 mL (FLUZONE HIGH-DOSE) (Completed)           Preventive health issues were discussed with patient, and age appropriate screening tests were ordered as noted in patient's After Visit Summary  Personalized health advice and appropriate referrals for health education or preventive services given if needed, as noted in patient's After Visit Summary       History of Present Illness:     Patient presents for a Medicare Wellness Visit    Patient presents to the office for a Medicare wellness visit as well as a 6 month follow-up visit  He has no major complaints other than some left-sided ear congestion and what the patient describes as a hollow sensation with occasional tenderness on the left side of his head  He denies rhinorrhea, sinus discomfort, sinus pain, frontal or maxillary headaches, fevers, chills, nausea or vomiting  Does have a history of seasonal allergies  Labs were reviewed  Lipids are near goal   Metabolic panel is essentially normal   CBC is significant for a low platelet count which is chronic  No other issues of significance     Patient Care Team:  Mariola Hoff MD as PCP - MD Mariola Traore MD     Review of Systems:     Review of Systems   Constitutional: Negative  Negative for activity change, appetite change, chills, diaphoresis, fatigue, fever and unexpected weight change  HENT: Negative  Eyes: Negative  Respiratory: Negative  Cardiovascular: Negative  Gastrointestinal: Negative  Endocrine: Negative  Genitourinary: Negative  Musculoskeletal: Negative  Skin: Negative  Neurological: Negative  Hematological: Negative  Psychiatric/Behavioral: The patient is not nervous/anxious           Problem List:     Patient Active Problem List   Diagnosis    Benign essential hypertension    Hypercholesterolemia    Nocturia    Thrombocytopenia ([facility] [address] [facility]    Screening for prostate cancer    Medicare annual wellness visit, subsequent    Other age-related cataract    Sjogren's syndrome ([facility] [address] [facility]    Gilbert's syndrome    Seasonal allergies      Past Medical and Surgical History:     Past Medical History:   Diagnosis Date    Hypercholesterolemia     Hypertension      Past Surgical History:   Procedure Laterality Date    HERNIA REPAIR        Family History:     Family History   Problem Relation Age of Onset    Hypertension Mother         benign essential      Social History: Social History     Socioeconomic History    Marital status: /Civil Union     Spouse name: None    Number of children: None    Years of education: None    Highest education level: None   Occupational History    None   Tobacco Use    Smoking status: Never Smoker    Smokeless tobacco: Never Used   Vaping Use    Vaping Use: Never used   Substance and Sexual Activity    Alcohol use: No    Drug use: No    Sexual activity: Yes   Other Topics Concern    None   Social History Narrative    None     Social Determinants of Health     Financial Resource Strain: Low Risk     Difficulty of Paying Living Expenses: Not hard at all   Food Insecurity: Not on file   Transportation Needs: No Transportation Needs    Lack of Transportation (Medical): No    Lack of Transportation (Non-Medical): No   Physical Activity: Not on file   Stress: Not on file   Social Connections: Not on file   Intimate Partner Violence: Not on file   Housing Stability: Not on file      Medications and Allergies:     Current Outpatient Medications   Medication Sig Dispense Refill    amLODIPine (NORVASC) 10 mg tablet Take 1 tablet (10 mg total) by mouth daily 90 tablet 1    carboxymethylcellulose (REFRESH PLUS) 0 5 % SOLN INSTILL 1 DROP BOTH EYES THREE TIMES A DAY FOR DRY EYES      dexbrompheniramine-pseudoephedrine (DRIXORAL) 6-120 MG per tablet Take 1 tablet by mouth every 12 (twelve) hours 30 tablet 2    fluticasone (FLONASE) 50 mcg/act nasal spray 1 spray into each nostril daily 48 g 1    metoprolol succinate (TOPROL-XL) 25 mg 24 hr tablet Take 1 tablet (25 mg total) by mouth daily 90 tablet 1    simvastatin (ZOCOR) 20 mg tablet Take 1 tablet (20 mg total) by mouth daily 90 tablet 1    terazosin (HYTRIN) 2 mg capsule Take 1 capsule (2 mg total) by mouth daily 90 capsule 1    aspirin (ECOTRIN LOW STRENGTH) 81 mg EC tablet Take 81 mg by mouth daily       No current facility-administered medications for this visit       No Known Allergies   Immunizations:     Immunization History   Administered Date(s) Administered    COVID-19 MODERNA VACC 0 5 ML IM 06/01/2022    COVID-19 PFIZER VACCINE 0 3 ML IM 02/17/2021, 03/10/2021, 10/23/2021    INFLUENZA 10/15/2014, 10/27/2016, 10/30/2017, 10/02/2018, 10/17/2019, 10/09/2020    Influenza Quadrivalent, 6-35 Months IM 10/27/2016    Influenza Split 10/05/2009    Influenza Split High Dose Preservative Free IM 10/11/2017    Influenza, Seasonal Vaccine, Quadrivalent, Adjuvanted,   5e 10/07/2021    Influenza, high dose seasonal 0 7 mL 10/05/2022    Influenza, injectable, quadrivalent, preservative free 0 5 mL 10/09/2020    Influenza, seasonal, injectable 10/15/2014, 10/15/2015    Pneumococcal Conjugate 13-Valent 12/13/2017    Pneumococcal Polysaccharide PPV23 10/01/2016, 03/12/2019    Tdap 08/08/2018    Zoster Vaccine Recombinant 10/09/2018, 01/28/2020      Health Maintenance:         Topic Date Due    Colorectal Cancer Screening  03/20/2025    Hepatitis C Screening  Completed     There are no preventive care reminders to display for this patient  Medicare Screening Tests and Risk Assessments:     Charlie House is here for his Subsequent Wellness visit  Health Risk Assessment:   Patient rates overall health as good  Patient feels that their physical health rating is same  Patient is satisfied with their life  Eyesight was rated as slightly worse  Hearing was rated as slightly worse  Patient feels that their emotional and mental health rating is same  Patients states they are sometimes angry  Patient states they are sometimes unusually tired/fatigued  Pain experienced in the last 7 days has been some  Patient's pain rating has been 4/10  Patient states that he has experienced no weight loss or gain in last 6 months  Depression Screening:   PHQ-2 Score: 0      Fall Risk Screening:    In the past year, patient has experienced: no history of falling in past year      Home Safety:  Patient does not have trouble with stairs inside or outside of their home  Patient has working smoke alarms and has working carbon monoxide detector  Home safety hazards include: none  Nutrition:   Current diet is Regular and Limited junk food  Medications:   Patient is currently taking over-the-counter supplements  OTC medications include: see medication list  Patient is able to manage medications  Activities of Daily Living (ADLs)/Instrumental Activities of Daily Living (IADLs):   Walk and transfer into and out of bed and chair?: Yes  Dress and groom yourself?: Yes    Bathe or shower yourself?: Yes    Feed yourself? Yes  Do your laundry/housekeeping?: Yes  Manage your money, pay your bills and track your expenses?: Yes  Make your own meals?: Yes    Do your own shopping?: Yes    Durable Medical Equipment Suppliers  no    Previous Hospitalizations:   Any hospitalizations or ED visits within the last 12 months?: No      Advance Care Planning:   Living will: Yes    Durable POA for healthcare:  Yes    Advanced directive: Yes    Advanced directive counseling given: Yes    Five wishes given: No    Patient declined ACP directive: Yes    End of Life Decisions reviewed with patient: Yes    Provider agrees with end of life decisions: Yes      PREVENTIVE SCREENINGS      Cardiovascular Screening:    General: Screening Not Indicated, History Lipid Disorder and Screening Current      Diabetes Screening:     General: Screening Current      Colorectal Cancer Screening:     General: Screening Current      Prostate Cancer Screening:    General: Screening Current      Osteoporosis Screening:    General: Screening Not Indicated      Abdominal Aortic Aneurysm (AAA) Screening:    Risk factors include: age between 73-69 yo        General: Screening Not Indicated      Lung Cancer Screening:     General: Screening Not Indicated      Hepatitis C Screening:    General: Screening Current    Screening, Brief Intervention, and Referral to Treatment (SBIRT)    Screening  Typical number of drinks in a day: 0  Typical number of drinks in a week: 0  Interpretation: Low risk drinking behavior  Single Item Drug Screening:  How often have you used an illegal drug (including marijuana) or a prescription medication for non-medical reasons in the past year? never    Single Item Drug Screen Score: 0  Interpretation: Negative screen for possible drug use disorder    No exam data present     Physical Exam:     /82 (BP Location: Left arm, Patient Position: Sitting, Cuff Size: Standard)   Pulse 65   Temp (!) 97 3 °F (36 3 °C) (Tympanic)   Ht 5' 4 65" (1 642 m)   Wt 71 1 kg (156 lb 12 8 oz)   SpO2 97%   BMI 26 38 kg/m²     Physical Exam  Vitals reviewed  Constitutional:       Appearance: He is well-developed and normal weight  HENT:      Head: Normocephalic and atraumatic  Right Ear: Tympanic membrane, ear canal and external ear normal  There is no impacted cerumen  Left Ear: Tympanic membrane, ear canal and external ear normal  There is no impacted cerumen  Nose: Nose normal       Mouth/Throat:      Mouth: Mucous membranes are moist       Pharynx: Oropharynx is clear  Eyes:      General: No scleral icterus  Conjunctiva/sclera: Conjunctivae normal       Pupils: Pupils are equal, round, and reactive to light  Neck:      Vascular: No carotid bruit  Cardiovascular:      Rate and Rhythm: Normal rate and regular rhythm  Pulses: Normal pulses  Heart sounds: Normal heart sounds  No murmur heard  Pulmonary:      Effort: Pulmonary effort is normal  No respiratory distress  Breath sounds: Normal breath sounds  Abdominal:      General: Abdomen is flat  Bowel sounds are normal  There is no distension  Palpations: Abdomen is soft  There is no mass  Tenderness: There is no abdominal tenderness  There is no right CVA tenderness, left CVA tenderness, guarding or rebound  Hernia: No hernia is present  Musculoskeletal:         General: No swelling, tenderness or deformity  Normal range of motion  Cervical back: Normal range of motion and neck supple  Right lower leg: No edema  Left lower leg: No edema  Skin:     General: Skin is warm and dry  Coloration: Skin is not jaundiced  Findings: No bruising, erythema or rash  Neurological:      General: No focal deficit present  Mental Status: He is alert and oriented to person, place, and time  Mental status is at baseline  Psychiatric:         Mood and Affect: Mood normal          Behavior: Behavior normal          Thought Content:  Thought content normal          Judgment: Judgment normal           Sandra Persaud MD

## 2022-10-28 ENCOUNTER — TELEPHONE (OUTPATIENT)
Dept: INTERNAL MEDICINE CLINIC | Facility: CLINIC | Age: 72
End: 2022-10-28

## 2023-01-03 DIAGNOSIS — I10 BENIGN HYPERTENSION: ICD-10-CM

## 2023-01-03 DIAGNOSIS — I10 BENIGN ESSENTIAL HYPERTENSION: ICD-10-CM

## 2023-01-03 DIAGNOSIS — E78.00 HYPERCHOLESTEROLEMIA: ICD-10-CM

## 2023-01-03 RX ORDER — METOPROLOL SUCCINATE 25 MG/1
25 TABLET, EXTENDED RELEASE ORAL DAILY
Qty: 90 TABLET | Refills: 1 | Status: SHIPPED | OUTPATIENT
Start: 2023-01-03

## 2023-01-03 RX ORDER — TERAZOSIN 2 MG/1
2 CAPSULE ORAL DAILY
Qty: 90 CAPSULE | Refills: 1 | Status: SHIPPED | OUTPATIENT
Start: 2023-01-03

## 2023-01-03 RX ORDER — SIMVASTATIN 20 MG
20 TABLET ORAL DAILY
Qty: 90 TABLET | Refills: 1 | Status: SHIPPED | OUTPATIENT
Start: 2023-01-03

## 2023-04-01 ENCOUNTER — APPOINTMENT (OUTPATIENT)
Dept: LAB | Age: 73
End: 2023-04-01

## 2023-04-01 DIAGNOSIS — I10 BENIGN ESSENTIAL HYPERTENSION: ICD-10-CM

## 2023-04-01 DIAGNOSIS — E78.00 HYPERCHOLESTEROLEMIA: ICD-10-CM

## 2023-04-01 DIAGNOSIS — E80.4 GILBERT'S SYNDROME: ICD-10-CM

## 2023-04-01 DIAGNOSIS — Z12.5 SCREENING FOR PROSTATE CANCER: ICD-10-CM

## 2023-04-01 DIAGNOSIS — D69.6 THROMBOCYTOPENIA (HCC): ICD-10-CM

## 2023-04-01 LAB
ALBUMIN SERPL BCP-MCNC: 4.4 G/DL (ref 3.5–5)
ALP SERPL-CCNC: 71 U/L (ref 46–116)
ALT SERPL W P-5'-P-CCNC: 35 U/L (ref 12–78)
ANION GAP SERPL CALCULATED.3IONS-SCNC: 3 MMOL/L (ref 4–13)
AST SERPL W P-5'-P-CCNC: 25 U/L (ref 5–45)
BASOPHILS # BLD AUTO: 0.07 THOUSANDS/ÂΜL (ref 0–0.1)
BASOPHILS NFR BLD AUTO: 2 % (ref 0–1)
BILIRUB SERPL-MCNC: 1.05 MG/DL (ref 0.2–1)
BUN SERPL-MCNC: 12 MG/DL (ref 5–25)
CALCIUM SERPL-MCNC: 9.5 MG/DL (ref 8.3–10.1)
CHLORIDE SERPL-SCNC: 109 MMOL/L (ref 96–108)
CHOLEST SERPL-MCNC: 116 MG/DL
CO2 SERPL-SCNC: 29 MMOL/L (ref 21–32)
CREAT SERPL-MCNC: 1.02 MG/DL (ref 0.6–1.3)
EOSINOPHIL # BLD AUTO: 0.18 THOUSAND/ÂΜL (ref 0–0.61)
EOSINOPHIL NFR BLD AUTO: 4 % (ref 0–6)
ERYTHROCYTE [DISTWIDTH] IN BLOOD BY AUTOMATED COUNT: 12.5 % (ref 11.6–15.1)
GFR SERPL CREATININE-BSD FRML MDRD: 73 ML/MIN/1.73SQ M
GLUCOSE P FAST SERPL-MCNC: 97 MG/DL (ref 65–99)
HCT VFR BLD AUTO: 46.4 % (ref 36.5–49.3)
HDLC SERPL-MCNC: 42 MG/DL
HGB BLD-MCNC: 15.3 G/DL (ref 12–17)
IMM GRANULOCYTES # BLD AUTO: 0.01 THOUSAND/UL (ref 0–0.2)
IMM GRANULOCYTES NFR BLD AUTO: 0 % (ref 0–2)
LDLC SERPL CALC-MCNC: 56 MG/DL (ref 0–100)
LYMPHOCYTES # BLD AUTO: 1.66 THOUSANDS/ÂΜL (ref 0.6–4.47)
LYMPHOCYTES NFR BLD AUTO: 40 % (ref 14–44)
MCH RBC QN AUTO: 29.7 PG (ref 26.8–34.3)
MCHC RBC AUTO-ENTMCNC: 33 G/DL (ref 31.4–37.4)
MCV RBC AUTO: 90 FL (ref 82–98)
MONOCYTES # BLD AUTO: 0.3 THOUSAND/ÂΜL (ref 0.17–1.22)
MONOCYTES NFR BLD AUTO: 7 % (ref 4–12)
NEUTROPHILS # BLD AUTO: 1.92 THOUSANDS/ÂΜL (ref 1.85–7.62)
NEUTS SEG NFR BLD AUTO: 47 % (ref 43–75)
NONHDLC SERPL-MCNC: 74 MG/DL
NRBC BLD AUTO-RTO: 0 /100 WBCS
PLATELET # BLD AUTO: 156 THOUSANDS/UL (ref 149–390)
PMV BLD AUTO: 11.4 FL (ref 8.9–12.7)
POTASSIUM SERPL-SCNC: 4.3 MMOL/L (ref 3.5–5.3)
PROT SERPL-MCNC: 8.2 G/DL (ref 6.4–8.4)
PSA SERPL-MCNC: 2.2 NG/ML (ref 0–4)
RBC # BLD AUTO: 5.15 MILLION/UL (ref 3.88–5.62)
SODIUM SERPL-SCNC: 141 MMOL/L (ref 135–147)
TRIGL SERPL-MCNC: 92 MG/DL
WBC # BLD AUTO: 4.14 THOUSAND/UL (ref 4.31–10.16)

## 2023-04-05 ENCOUNTER — OFFICE VISIT (OUTPATIENT)
Dept: INTERNAL MEDICINE CLINIC | Facility: CLINIC | Age: 73
End: 2023-04-05

## 2023-04-05 VITALS
HEIGHT: 65 IN | WEIGHT: 159.8 LBS | HEART RATE: 72 BPM | SYSTOLIC BLOOD PRESSURE: 144 MMHG | TEMPERATURE: 97.7 F | BODY MASS INDEX: 26.62 KG/M2 | DIASTOLIC BLOOD PRESSURE: 80 MMHG | OXYGEN SATURATION: 96 %

## 2023-04-05 DIAGNOSIS — I10 BENIGN ESSENTIAL HYPERTENSION: Primary | ICD-10-CM

## 2023-04-05 DIAGNOSIS — D69.6 THROMBOCYTOPENIA (HCC): ICD-10-CM

## 2023-04-05 DIAGNOSIS — M35.01 SJOGREN'S SYNDROME WITH KERATOCONJUNCTIVITIS SICCA (HCC): ICD-10-CM

## 2023-04-05 DIAGNOSIS — E78.00 HYPERCHOLESTEROLEMIA: ICD-10-CM

## 2023-04-05 RX ORDER — KETOTIFEN FUMARATE 0.35 MG/ML
SOLUTION/ DROPS OPHTHALMIC
COMMUNITY
Start: 2022-11-23

## 2023-04-05 RX ORDER — MULTIVIT-MIN/FERROUS FUMARATE 15 MG
1 TABLET ORAL DAILY
COMMUNITY
Start: 2023-03-01

## 2023-04-05 RX ORDER — VALSARTAN 80 MG/1
80 TABLET ORAL DAILY
Qty: 90 TABLET | Refills: 3 | Status: SHIPPED | OUTPATIENT
Start: 2023-04-05

## 2023-04-05 NOTE — ASSESSMENT & PLAN NOTE
Pressure is mildly elevated  We will add valsartan 80 mg daily to his regimen and follow-up in 6 months

## 2023-04-05 NOTE — ASSESSMENT & PLAN NOTE
Platelets are actually normal in the low end of the spectrum at 156,000  Patient is not demonstrating any bleeding tendencies

## 2023-09-30 ENCOUNTER — APPOINTMENT (OUTPATIENT)
Dept: LAB | Age: 73
End: 2023-09-30
Payer: MEDICARE

## 2023-09-30 DIAGNOSIS — E78.00 HYPERCHOLESTEROLEMIA: ICD-10-CM

## 2023-09-30 DIAGNOSIS — I10 BENIGN ESSENTIAL HYPERTENSION: ICD-10-CM

## 2023-09-30 DIAGNOSIS — D69.6 THROMBOCYTOPENIA (HCC): ICD-10-CM

## 2023-09-30 LAB
ALBUMIN SERPL BCP-MCNC: 4.5 G/DL (ref 3.5–5)
ALP SERPL-CCNC: 52 U/L (ref 34–104)
ALT SERPL W P-5'-P-CCNC: 25 U/L (ref 7–52)
ANION GAP SERPL CALCULATED.3IONS-SCNC: 6 MMOL/L
AST SERPL W P-5'-P-CCNC: 22 U/L (ref 13–39)
BASOPHILS # BLD AUTO: 0.04 THOUSANDS/ÂΜL (ref 0–0.1)
BASOPHILS NFR BLD AUTO: 1 % (ref 0–1)
BILIRUB SERPL-MCNC: 1.23 MG/DL (ref 0.2–1)
BUN SERPL-MCNC: 13 MG/DL (ref 5–25)
CALCIUM SERPL-MCNC: 9.4 MG/DL (ref 8.4–10.2)
CHLORIDE SERPL-SCNC: 104 MMOL/L (ref 96–108)
CHOLEST SERPL-MCNC: 113 MG/DL
CO2 SERPL-SCNC: 29 MMOL/L (ref 21–32)
CREAT SERPL-MCNC: 1.02 MG/DL (ref 0.6–1.3)
EOSINOPHIL # BLD AUTO: 0.15 THOUSAND/ÂΜL (ref 0–0.61)
EOSINOPHIL NFR BLD AUTO: 3 % (ref 0–6)
ERYTHROCYTE [DISTWIDTH] IN BLOOD BY AUTOMATED COUNT: 13.1 % (ref 11.6–15.1)
GFR SERPL CREATININE-BSD FRML MDRD: 73 ML/MIN/1.73SQ M
GLUCOSE P FAST SERPL-MCNC: 89 MG/DL (ref 65–99)
HCT VFR BLD AUTO: 44.4 % (ref 36.5–49.3)
HDLC SERPL-MCNC: 41 MG/DL
HGB BLD-MCNC: 14.9 G/DL (ref 12–17)
IMM GRANULOCYTES # BLD AUTO: 0.01 THOUSAND/UL (ref 0–0.2)
IMM GRANULOCYTES NFR BLD AUTO: 0 % (ref 0–2)
LDLC SERPL CALC-MCNC: 56 MG/DL (ref 0–100)
LYMPHOCYTES # BLD AUTO: 1.7 THOUSANDS/ÂΜL (ref 0.6–4.47)
LYMPHOCYTES NFR BLD AUTO: 36 % (ref 14–44)
MCH RBC QN AUTO: 30.1 PG (ref 26.8–34.3)
MCHC RBC AUTO-ENTMCNC: 33.6 G/DL (ref 31.4–37.4)
MCV RBC AUTO: 90 FL (ref 82–98)
MONOCYTES # BLD AUTO: 0.3 THOUSAND/ÂΜL (ref 0.17–1.22)
MONOCYTES NFR BLD AUTO: 6 % (ref 4–12)
NEUTROPHILS # BLD AUTO: 2.48 THOUSANDS/ÂΜL (ref 1.85–7.62)
NEUTS SEG NFR BLD AUTO: 54 % (ref 43–75)
NONHDLC SERPL-MCNC: 72 MG/DL
NRBC BLD AUTO-RTO: 0 /100 WBCS
PLATELET # BLD AUTO: 120 THOUSANDS/UL (ref 149–390)
PMV BLD AUTO: 12.1 FL (ref 8.9–12.7)
POTASSIUM SERPL-SCNC: 4.2 MMOL/L (ref 3.5–5.3)
PROT SERPL-MCNC: 7.7 G/DL (ref 6.4–8.4)
RBC # BLD AUTO: 4.95 MILLION/UL (ref 3.88–5.62)
SODIUM SERPL-SCNC: 139 MMOL/L (ref 135–147)
TRIGL SERPL-MCNC: 78 MG/DL
WBC # BLD AUTO: 4.68 THOUSAND/UL (ref 4.31–10.16)

## 2023-09-30 PROCEDURE — 85025 COMPLETE CBC W/AUTO DIFF WBC: CPT

## 2023-09-30 PROCEDURE — 80053 COMPREHEN METABOLIC PANEL: CPT

## 2023-09-30 PROCEDURE — 80061 LIPID PANEL: CPT

## 2023-09-30 PROCEDURE — 36415 COLL VENOUS BLD VENIPUNCTURE: CPT

## 2023-10-06 ENCOUNTER — OFFICE VISIT (OUTPATIENT)
Age: 73
End: 2023-10-06
Payer: MEDICARE

## 2023-10-06 VITALS
SYSTOLIC BLOOD PRESSURE: 146 MMHG | BODY MASS INDEX: 25.86 KG/M2 | HEART RATE: 66 BPM | DIASTOLIC BLOOD PRESSURE: 80 MMHG | HEIGHT: 65 IN | OXYGEN SATURATION: 98 % | TEMPERATURE: 97.6 F | WEIGHT: 155.2 LBS

## 2023-10-06 DIAGNOSIS — D69.6 THROMBOCYTOPENIA (HCC): ICD-10-CM

## 2023-10-06 DIAGNOSIS — R35.1 BENIGN PROSTATIC HYPERPLASIA WITH NOCTURIA: ICD-10-CM

## 2023-10-06 DIAGNOSIS — I10 BENIGN ESSENTIAL HYPERTENSION: ICD-10-CM

## 2023-10-06 DIAGNOSIS — E78.00 HYPERCHOLESTEROLEMIA: ICD-10-CM

## 2023-10-06 DIAGNOSIS — N40.1 BENIGN PROSTATIC HYPERPLASIA WITH NOCTURIA: ICD-10-CM

## 2023-10-06 DIAGNOSIS — Z00.00 MEDICARE ANNUAL WELLNESS VISIT, SUBSEQUENT: Primary | ICD-10-CM

## 2023-10-06 DIAGNOSIS — E80.4 GILBERT'S SYNDROME: ICD-10-CM

## 2023-10-06 PROBLEM — R06.00 DYSPNEA, UNSPECIFIED: Status: ACTIVE | Noted: 2023-10-06

## 2023-10-06 PROCEDURE — 99214 OFFICE O/P EST MOD 30 MIN: CPT | Performed by: INTERNAL MEDICINE

## 2023-10-06 PROCEDURE — G0439 PPPS, SUBSEQ VISIT: HCPCS | Performed by: INTERNAL MEDICINE

## 2023-10-06 NOTE — PROGRESS NOTES
Assessment and Plan:     Problem List Items Addressed This Visit        Cardiovascular and Mediastinum    Benign essential hypertension - Primary     Blood pressure stable with combination of valsartan, terazosin metoprolol and amlodipine         Relevant Orders    CBC and differential    Comprehensive metabolic panel       Hematopoietic and Hemostatic    Thrombocytopenia (HCC)     No bleeding tendencies         Relevant Orders    CBC and differential    Comprehensive metabolic panel       Genitourinary    Benign prostatic hyperplasia with lower urinary tract symptoms     Terazosin for 4 mg daily            Other    Hypercholesterolemia     Lipids are at goal levels. Medicare annual wellness visit, subsequent     Patient is up-to-date with age-appropriate screenings and immunizations. He received a flu vaccine today         Gilbert's syndrome     Transaminases within normal limits. Total bilirubin mildly elevated at 1.23            Depression Screening and Follow-up Plan: Patient was screened for depression during today's encounter. They screened negative with a PHQ-2 score of 0. Preventive health issues were discussed with patient, and age appropriate screening tests were ordered as noted in patient's After Visit Summary. Personalized health advice and appropriate referrals for health education or preventive services given if needed, as noted in patient's After Visit Summary. History of Present Illness:     Patient presents for a Medicare Wellness Visit    Patient presents to the office for a Medicare wellness visit along with a 6-month follow-up. In general he has no problems or complaints. He has a history of hypertension BPH for which he has been experiencing occasional nocturia. Labs were reviewed. Lipids are at goal levels CBC is essentially normal other than some very mild thrombocytopenia and metabolic panel is normal other than a serum bilirubin of 1.23.      Patient Care Team:  Yehuda Hammond MD as PCP - MD Yehuda Salgado MD     Review of Systems:     Review of Systems   Constitutional: Negative. Negative for activity change, appetite change, chills, diaphoresis, fatigue, fever and unexpected weight change. HENT: Negative. Eyes: Negative. Respiratory: Negative. Cardiovascular: Negative. Gastrointestinal: Negative. Endocrine: Negative. Genitourinary:        Nocturia   Musculoskeletal: Negative. Skin: Negative. Neurological: Negative. Hematological: Negative. Psychiatric/Behavioral: The patient is not nervous/anxious.          Problem List:     Patient Active Problem List   Diagnosis   • Benign essential hypertension   • Hypercholesterolemia   • Nocturia   • Thrombocytopenia (HCC)   • Screening for prostate cancer   • Medicare annual wellness visit, subsequent   • Other age-related cataract   • Sjogren's syndrome ([address])   • Gilbert's syndrome   • Seasonal allergies   • Benign prostatic hyperplasia with lower urinary tract symptoms   • Dyspnea, unspecified      Past Medical and Surgical History:     Past Medical History:   Diagnosis Date   • Hypercholesterolemia    • Hypertension      Past Surgical History:   Procedure Laterality Date   • HERNIA REPAIR        Family History:     Family History   Problem Relation Age of Onset   • Hypertension Mother         benign essential      Social History:     Social History     Socioeconomic History   • Marital status: /Civil Union     Spouse name: None   • Number of children: None   • Years of education: None   • Highest education level: None   Occupational History   • None   Tobacco Use   • Smoking status: Never   • Smokeless tobacco: Never   Vaping Use   • Vaping Use: Never used   Substance and Sexual Activity   • Alcohol use: No   • Drug use: No   • Sexual activity: Yes   Other Topics Concern   • None   Social History Narrative   • None     Social Determinants of Health     Financial Resource Strain: Low Risk  (10/6/2023)    Overall Financial Resource Strain (CARDIA)    • Difficulty of Paying Living Expenses: Not hard at all   Food Insecurity: Not on file   Transportation Needs: No Transportation Needs (10/6/2023)    PRAPARE - Transportation    • Lack of Transportation (Medical): No    • Lack of Transportation (Non-Medical): No   Physical Activity: Not on file   Stress: Not on file   Social Connections: Not on file   Intimate Partner Violence: Not on file   Housing Stability: Not on file      Medications and Allergies:     Current Outpatient Medications   Medication Sig Dispense Refill   • amLODIPine (NORVASC) 10 mg tablet Take 1 tablet (10 mg total) by mouth daily 90 tablet 1   • carboxymethylcellulose (REFRESH PLUS) 0.5 % SOLN INSTILL 1 DROP BOTH EYES THREE TIMES A DAY FOR DRY EYES     • Cholecalciferol 125 MCG (5000 UT) capsule Take 1 capsule by mouth daily     • Coenzyme Q10 300 MG CAPS TAKE 300MG BY MOUTH EVERY DAY     • fluticasone (FLONASE) 50 mcg/act nasal spray 1 spray into each nostril daily 48 g 1   • metoprolol succinate (TOPROL-XL) 25 mg 24 hr tablet Take 1 tablet (25 mg total) by mouth daily 90 tablet 1   • Multiple Vitamins-Minerals (Multivitamin-Minerals) TABS Take 1 tablet by mouth daily     • simvastatin (ZOCOR) 20 mg tablet Take 1 tablet (20 mg total) by mouth daily 90 tablet 1   • terazosin (HYTRIN) 2 mg capsule Take 1 capsule (2 mg total) by mouth daily (Patient taking differently: Take 4 mg by mouth daily) 90 capsule 1   • valsartan (DIOVAN) 80 mg tablet Take 1 tablet (80 mg total) by mouth daily 90 tablet 3     No current facility-administered medications for this visit.      No Known Allergies   Immunizations:     Immunization History   Administered Date(s) Administered   • COVID-19 MODERNA VACC 0.5 ML IM 06/01/2022   • COVID-19 PFIZER VACCINE 0.3 ML IM 02/17/2021, 03/10/2021, 10/23/2021   • INFLUENZA 10/15/2014, 10/27/2016, 10/30/2017, 10/02/2018, 10/17/2019, 10/09/2020, 10/05/2022   • Influenza Quadrivalent, 6-35 Months IM 10/27/2016   • Influenza Split 10/05/2009   • Influenza Split High Dose Preservative Free IM 10/11/2017   • Influenza, Seasonal Vaccine, Quadrivalent, Adjuvanted, . 5e 10/07/2021   • Influenza, high dose seasonal 0.7 mL 10/05/2022   • Influenza, injectable, quadrivalent, preservative free 0.5 mL 10/09/2020   • Influenza, seasonal, injectable 10/15/2014, 10/15/2015   • Pneumococcal Conjugate 13-Valent 12/13/2017   • Pneumococcal Polysaccharide PPV23 10/01/2016, 03/12/2019   • Tdap 08/08/2018   • Zoster Vaccine Recombinant 10/09/2018, 01/28/2020      Health Maintenance:         Topic Date Due   • Colorectal Cancer Screening  03/20/2025   • Hepatitis C Screening  Completed         Topic Date Due   • COVID-19 Vaccine (5 - Pfizer series) 07/27/2022   • Influenza Vaccine (1) 09/01/2023      Medicare Screening Tests and Risk Assessments:     Ponce Gonzalez is here for his Subsequent Wellness visit. Health Risk Assessment:   Patient rates overall health as good. Patient feels that their physical health rating is same. Patient is satisfied with their life. Eyesight was rated as slightly worse. Hearing was rated as slightly worse. Patient feels that their emotional and mental health rating is same. Patients states they are sometimes angry. Patient states they are sometimes unusually tired/fatigued. Pain experienced in the last 7 days has been some. Patient's pain rating has been 6/10. Patient states that he has experienced no weight loss or gain in last 6 months. Depression Screening:   PHQ-2 Score: 0      Fall Risk Screening: In the past year, patient has experienced: no history of falling in past year      Home Safety:  Patient does not have trouble with stairs inside or outside of their home. Patient has working smoke alarms and has working carbon monoxide detector. Home safety hazards include: none. Nutrition:   Current diet is Regular. Medications:   Patient is currently taking over-the-counter supplements. OTC medications include: see medication list. Patient is able to manage medications. Activities of Daily Living (ADLs)/Instrumental Activities of Daily Living (IADLs):   Walk and transfer into and out of bed and chair?: Yes  Dress and groom yourself?: Yes    Bathe or shower yourself?: Yes    Feed yourself? Yes  Do your laundry/housekeeping?: Yes  Manage your money, pay your bills and track your expenses?: Yes  Make your own meals?: Yes    Do your own shopping?: Yes    Durable Medical Equipment Suppliers  no    Previous Hospitalizations:   Any hospitalizations or ED visits within the last 12 months?: No      Advance Care Planning:   Living will: Yes    Durable POA for healthcare: Yes    Advanced directive: Yes    Advanced directive counseling given: No    Five wishes given: No    Patient declined ACP directive: Yes    End of Life Decisions reviewed with patient: Yes    Provider agrees with end of life decisions: Yes      Cognitive Screening:   Provider or family/friend/caregiver concerned regarding cognition?: No    PREVENTIVE SCREENINGS      Cardiovascular Screening:    General: Screening Not Indicated, History Lipid Disorder and Screening Current      Diabetes Screening:     General: Screening Current      Colorectal Cancer Screening:     General: Screening Current      Prostate Cancer Screening:    General: Screening Current      Abdominal Aortic Aneurysm (AAA) Screening:    Risk factors include: age between 70-77 yo        Lung Cancer Screening:     General: Screening Not Indicated      Hepatitis C Screening:    General: Screening Current    Screening, Brief Intervention, and Referral to Treatment (SBIRT)    Screening  Typical number of drinks in a day: 0  Typical number of drinks in a week: 0  Interpretation: Low risk drinking behavior.     Single Item Drug Screening:  How often have you used an illegal drug (including marijuana) or a prescription medication for non-medical reasons in the past year? never    Single Item Drug Screen Score: 0  Interpretation: Negative screen for possible drug use disorder    No results found. Physical Exam:     /80 (BP Location: Left arm, Patient Position: Sitting, Cuff Size: Standard)   Pulse 66   Temp 97.6 °F (36.4 °C) (Tympanic)   Ht 5' 4.72" (1.644 m)   Wt 70.4 kg (155 lb 3.2 oz)   SpO2 98%   BMI 26.05 kg/m²     Physical Exam  Vitals reviewed. Constitutional:       General: He is not in acute distress. Appearance: Normal appearance. He is well-developed and normal weight. He is not ill-appearing, toxic-appearing or diaphoretic. HENT:      Head: Normocephalic and atraumatic. Right Ear: Tympanic membrane normal.      Left Ear: External ear normal.      Nose: Nose normal.      Mouth/Throat:      Mouth: Mucous membranes are moist.   Eyes:      General: No scleral icterus. Conjunctiva/sclera: Conjunctivae normal.      Pupils: Pupils are equal, round, and reactive to light. Neck:      Vascular: No carotid bruit. Cardiovascular:      Rate and Rhythm: Normal rate and regular rhythm. Pulses: Normal pulses. Heart sounds: Normal heart sounds. No murmur heard. Pulmonary:      Effort: Pulmonary effort is normal. No respiratory distress. Breath sounds: Normal breath sounds. Abdominal:      General: Abdomen is flat. There is no distension. Tenderness: There is no abdominal tenderness. Musculoskeletal:         General: No swelling, tenderness or deformity. Normal range of motion. Cervical back: Normal range of motion and neck supple. Right lower leg: No edema. Left lower leg: No edema. Lymphadenopathy:      Cervical: No cervical adenopathy. Skin:     General: Skin is warm. Capillary Refill: Capillary refill takes less than 2 seconds. Coloration: Skin is not jaundiced. Findings: No bruising, erythema or rash.    Neurological: General: No focal deficit present. Mental Status: He is alert and oriented to person, place, and time. Mental status is at baseline. Psychiatric:         Mood and Affect: Mood normal.         Behavior: Behavior normal.         Thought Content:  Thought content normal.         Judgment: Judgment normal.          Julio Sunshine MD

## 2023-10-07 NOTE — ASSESSMENT & PLAN NOTE
Patient is up-to-date with age-appropriate screenings and immunizations.   He received a flu vaccine today

## 2024-02-21 PROBLEM — Z00.00 MEDICARE ANNUAL WELLNESS VISIT, SUBSEQUENT: Status: RESOLVED | Noted: 2019-06-14 | Resolved: 2024-02-21

## 2024-02-21 PROBLEM — Z12.5 SCREENING FOR PROSTATE CANCER: Status: RESOLVED | Noted: 2018-07-23 | Resolved: 2024-02-21

## 2024-03-12 ENCOUNTER — CONSULT (OUTPATIENT)
Age: 74
End: 2024-03-12
Payer: MEDICARE

## 2024-03-12 VITALS
DIASTOLIC BLOOD PRESSURE: 86 MMHG | TEMPERATURE: 96.9 F | HEART RATE: 76 BPM | HEIGHT: 65 IN | OXYGEN SATURATION: 98 % | WEIGHT: 157.6 LBS | SYSTOLIC BLOOD PRESSURE: 140 MMHG | BODY MASS INDEX: 26.26 KG/M2

## 2024-03-12 DIAGNOSIS — I10 BENIGN ESSENTIAL HYPERTENSION: ICD-10-CM

## 2024-03-12 DIAGNOSIS — Z01.818 PRE-OP EXAM: ICD-10-CM

## 2024-03-12 DIAGNOSIS — H02.9 EYELID ABNORMALITY: Primary | ICD-10-CM

## 2024-03-12 DIAGNOSIS — D69.6 THROMBOCYTOPENIA (HCC): ICD-10-CM

## 2024-03-12 DIAGNOSIS — R35.1 BENIGN PROSTATIC HYPERPLASIA WITH NOCTURIA: ICD-10-CM

## 2024-03-12 DIAGNOSIS — N40.1 BENIGN PROSTATIC HYPERPLASIA WITH NOCTURIA: ICD-10-CM

## 2024-03-12 DIAGNOSIS — E78.00 HYPERCHOLESTEROLEMIA: ICD-10-CM

## 2024-03-12 PROCEDURE — 99214 OFFICE O/P EST MOD 30 MIN: CPT | Performed by: NURSE PRACTITIONER

## 2024-03-12 NOTE — PROGRESS NOTES
" Assessment/Plan:    Pre-op exam  -cleared for surgery   -advised staff to fax consult    Benign essential hypertension  BP elevated today, but normal yesterday at /80  Continue Norvasc, Metoprolol and Valsartan     Benign prostatic hyperplasia with lower urinary tract symptoms  Terazosin for 4 mg daily    Thrombocytopenia (HCC)  No bleeding tendencies    Hypercholesterolemia  Continue on Simvastatin        Diagnoses and all orders for this visit:    Eyelid abnormality    Pre-op exam    Benign essential hypertension    Benign prostatic hyperplasia with nocturia    Thrombocytopenia (HCC)    Hypercholesterolemia    Other orders  -     Diclofenac Sodium (VOLTAREN) 1 %; APPLY 2GM TO UPPER EXTREMITIES TOPICALLY THREE TIMES A DAY AS NEEDED FOR PAIN AND INFLAMMATION **USE DOSING CARD** (DO NOT EXCEED 16 GRAMS DAILY TO ANY AFFECTED JOINT OF THE LOWER EXTREMITIES. DO NOT EXCEED 8 GRAMS DAILY TO ANY AFFECTED JOINT OF THE UPPER EXTREMITIES. DO NOT EXCEED A TOTAL DOSE OF 32 GRAMS DAILY OVER ALL JOINTS)          Subjective:    Srinivas Rao is a 73 y.o. year old male who presents to the office today for a preoperative consultation at the request of surgeon Dr. Al Delcid who plans on performing \"eye lid surgery\" on April 11. This consultation is requested for the specific conditions prompting preoperative evaluation (i.e. because of potential affect on operative risk). Planned anesthesia:  MAC . The patient has the following known anesthesia issues:  denies . Patients bleeding risk: no recent abnormal bleeding. Patient does not have objections to receiving blood products if needed.    Patient is able to walk 4 blocks without symptoms.  Patient is able to walk up 2 flights of stairs without symptoms.     Significant past medical history includes:  Patient Active Problem List   Diagnosis    Benign essential hypertension    Hypercholesterolemia    Nocturia    Thrombocytopenia (HCC)    Other age-related cataract    Sjogren's " syndrome (HCC)    Gilbert's syndrome    Seasonal allergies    Benign prostatic hyperplasia with lower urinary tract symptoms    Dyspnea, unspecified    Eyelid abnormality    Pre-op exam       Tobacco use: denies  Alcohol use: ocassional   Illicit drug use: denies    Symptoms:   Easy bleeding: no  Easy bruising: no  Frequent nose bleeds: no  Chest pain: no  Cough: no  Dyspnea on exertion:no  Edema: no  Palpitations: no  Wheezing: no    Living situation: Patient lives with wife, daughter and granddaughter in a two story home. His daughter will be caring for his after surgery. hedoes not have post-op concerns.     The following portions of the patient's history were reviewed and updated as appropriate: allergies, current medications, past family history, past medical history, past social history, past surgical history, and problem list.    Review of Systems  Review of Systems   Constitutional:  Negative for activity change, appetite change, chills, diaphoresis and fever.   HENT:  Negative for congestion, ear discharge, ear pain, postnasal drip, rhinorrhea, sinus pressure, sinus pain and sore throat.    Eyes:  Negative for pain, discharge, itching and visual disturbance.   Respiratory:  Negative for cough, chest tightness, shortness of breath and wheezing.    Cardiovascular:  Negative for chest pain, palpitations and leg swelling.   Gastrointestinal:  Negative for abdominal pain, constipation, diarrhea, nausea and vomiting.   Endocrine: Negative for polydipsia, polyphagia and polyuria.   Genitourinary:  Negative for difficulty urinating, dysuria and urgency.   Musculoskeletal:  Negative for arthralgias, back pain and neck pain.   Skin:  Negative for rash and wound.   Neurological:  Negative for dizziness, weakness, numbness and headaches.         Past Medical History:   Diagnosis Date    Hypercholesterolemia     Hypertension      Past Surgical History:   Procedure Laterality Date    HERNIA REPAIR       Social History  "    Tobacco Use    Smoking status: Never    Smokeless tobacco: Never   Vaping Use    Vaping status: Never Used   Substance Use Topics    Alcohol use: No    Drug use: No     Family History   Problem Relation Age of Onset    Hypertension Mother         benign essential     Patient has no known allergies.  Current Outpatient Medications   Medication Sig Dispense Refill    amLODIPine (NORVASC) 10 mg tablet Take 1 tablet (10 mg total) by mouth daily 90 tablet 1    carboxymethylcellulose (REFRESH PLUS) 0.5 % SOLN INSTILL 1 DROP BOTH EYES THREE TIMES A DAY FOR DRY EYES      Cholecalciferol 125 MCG (5000 UT) capsule Take 1 capsule by mouth daily      Coenzyme Q10 300 MG CAPS TAKE 300MG BY MOUTH EVERY DAY      Diclofenac Sodium (VOLTAREN) 1 % APPLY 2GM TO UPPER EXTREMITIES TOPICALLY THREE TIMES A DAY AS NEEDED FOR PAIN AND INFLAMMATION **USE DOSING CARD** (DO NOT EXCEED 16 GRAMS DAILY TO ANY AFFECTED JOINT OF THE LOWER EXTREMITIES. DO NOT EXCEED 8 GRAMS DAILY TO ANY AFFECTED JOINT OF THE UPPER EXTREMITIES. DO NOT EXCEED A TOTAL DOSE OF 32 GRAMS DAILY OVER ALL JOINTS)      fluticasone (FLONASE) 50 mcg/act nasal spray 1 spray into each nostril daily 48 g 1    metoprolol succinate (TOPROL-XL) 25 mg 24 hr tablet Take 1 tablet (25 mg total) by mouth daily 90 tablet 1    Multiple Vitamins-Minerals (Multivitamin-Minerals) TABS Take 1 tablet by mouth daily      simvastatin (ZOCOR) 20 mg tablet Take 1 tablet (20 mg total) by mouth daily 90 tablet 1    terazosin (HYTRIN) 2 mg capsule Take 1 capsule (2 mg total) by mouth daily (Patient taking differently: Take 4 mg by mouth daily) 90 capsule 1    valsartan (DIOVAN) 80 mg tablet Take 1 tablet (80 mg total) by mouth daily 90 tablet 3     No current facility-administered medications for this visit.       Objective:       /86   Pulse 76   Temp (!) 96.9 °F (36.1 °C) (Temporal)   Ht 5' 4.69\" (1.643 m)   Wt 71.5 kg (157 lb 9.6 oz)   SpO2 98%   BMI 26.48 kg/m²   Physical " Exam  Constitutional:       General: He is not in acute distress.     Appearance: He is well-developed. He is not diaphoretic.   HENT:      Head: Normocephalic and atraumatic.      Right Ear: External ear normal.      Left Ear: External ear normal.      Nose: Nose normal.      Mouth/Throat:      Mouth: Mucous membranes are moist.      Pharynx: No oropharyngeal exudate or posterior oropharyngeal erythema.   Eyes:      General:         Right eye: No discharge.         Left eye: No discharge.      Conjunctiva/sclera: Conjunctivae normal.      Pupils: Pupils are equal, round, and reactive to light.   Neck:      Thyroid: No thyromegaly.   Cardiovascular:      Rate and Rhythm: Normal rate and regular rhythm.      Heart sounds: Normal heart sounds. No murmur heard.     No friction rub. No gallop.   Pulmonary:      Effort: Pulmonary effort is normal. No respiratory distress.      Breath sounds: Normal breath sounds. No stridor. No wheezing or rales.   Abdominal:      General: Bowel sounds are normal. There is no distension.      Palpations: Abdomen is soft.      Tenderness: There is no abdominal tenderness.   Musculoskeletal:      Cervical back: Normal range of motion and neck supple.   Lymphadenopathy:      Cervical: No cervical adenopathy.   Skin:     General: Skin is warm and dry.      Findings: No erythema or rash.   Neurological:      Mental Status: He is alert and oriented to person, place, and time.   Psychiatric:         Behavior: Behavior normal.         Thought Content: Thought content normal.         Judgment: Judgment normal.                  Lab Review   No visits with results within 2 Month(s) from this visit.   Latest known visit with results is:   Appointment on 09/30/2023   Component Date Value    WBC 09/30/2023 4.68     RBC 09/30/2023 4.95     Hemoglobin 09/30/2023 14.9     Hematocrit 09/30/2023 44.4     MCV 09/30/2023 90     MCH 09/30/2023 30.1     MCHC 09/30/2023 33.6     RDW 09/30/2023 13.1     MPV  2023 12.1     Platelets 2023 120 (L)     nRBC 2023 0     Neutrophils Relative 2023 54     Immature Grans % 2023 0     Lymphocytes Relative 2023 36     Monocytes Relative 2023 6     Eosinophils Relative 2023 3     Basophils Relative 2023 1     Neutrophils Absolute 2023 2.48     Absolute Immature Grans 2023 0.01     Absolute Lymphocytes 2023 1.70     Absolute Monocytes 2023 0.30     Eosinophils Absolute 2023 0.15     Basophils Absolute 2023 0.04     Sodium 2023 139     Potassium 2023 4.2     Chloride 2023 104     CO2 2023 29     ANION GAP 2023 6     BUN 2023 13     Creatinine 2023 1.02     Glucose, Fasting 2023 89     Calcium 2023 9.4     AST 2023 22     ALT 2023 25     Alkaline Phosphatase 2023 52     Total Protein 2023 7.7     Albumin 2023 4.5     Total Bilirubin 2023 1.23 (H)     eGFR 2023 73     Cholesterol 2023 113     Triglycerides 2023 78     HDL, Direct 2023 41     LDL Calculated 2023 56     Non-HDL-Chol (CHOL-HDL) 2023 72         Assessment:     73 y.o. male with planned surgery as above.    Known risk factors for perioperative complications: None        RCRI  High Risk surgery?         1 Point  CAD History:         1 Point   MI; Positive Stress Test; CP due to Mi;  Nitrate Usage to control Angina; Pathologic Q wave on EKG  CHF Active:         1 Point   Pulm Edema; Paroxysmal Nocturnal Dyspnea;  Bibasilar Rales (crackles);S3; CHF on CXR  Cerebrovascular Disease (TIA or CVA):     1 Point  DM on Insulin:        1 Point  Serum Creat >2.0 mg/dl:       1 Point          Total Points: 0     Scorin: Class I, Very Low Risk (0.4%)     1: Class II, Low risk (0.9%)     2: Class III Moderate (6.6%)     3: Class IV High (>11%)    Cardiac Risk Estimation:     Srinivas Rao is cleared from a cardiovascular  standpoint to proceed with surgery.   Reevaluation needed if he should present with symptoms prior to surgery.      Plan:  Preoperative workup as follows none.  Change in medication regimen before surgery:  Patient is to discuss with surgeon prior to surgery .

## 2024-03-25 ENCOUNTER — HOSPITAL ENCOUNTER (OUTPATIENT)
Dept: CT IMAGING | Facility: HOSPITAL | Age: 74
Discharge: HOME | End: 2024-03-25
Payer: COMMERCIAL

## 2024-03-25 DIAGNOSIS — K40.90 RIGHT INGUINAL HERNIA: ICD-10-CM

## 2024-03-25 PROCEDURE — 74176 CT ABD & PELVIS W/O CONTRAST: CPT

## 2024-04-10 ENCOUNTER — APPOINTMENT (OUTPATIENT)
Dept: LAB | Age: 74
End: 2024-04-10
Payer: MEDICARE

## 2024-04-10 DIAGNOSIS — I10 BENIGN ESSENTIAL HYPERTENSION: ICD-10-CM

## 2024-04-10 DIAGNOSIS — D69.6 THROMBOCYTOPENIA (HCC): ICD-10-CM

## 2024-04-10 LAB
ALBUMIN SERPL BCP-MCNC: 4.7 G/DL (ref 3.5–5)
ALP SERPL-CCNC: 54 U/L (ref 34–104)
ALT SERPL W P-5'-P-CCNC: 25 U/L (ref 7–52)
ANION GAP SERPL CALCULATED.3IONS-SCNC: 6 MMOL/L (ref 4–13)
AST SERPL W P-5'-P-CCNC: 26 U/L (ref 13–39)
BASOPHILS # BLD AUTO: 0.05 THOUSANDS/ÂΜL (ref 0–0.1)
BASOPHILS NFR BLD AUTO: 1 % (ref 0–1)
BILIRUB SERPL-MCNC: 1.24 MG/DL (ref 0.2–1)
BUN SERPL-MCNC: 11 MG/DL (ref 5–25)
CALCIUM SERPL-MCNC: 9.4 MG/DL (ref 8.4–10.2)
CHLORIDE SERPL-SCNC: 105 MMOL/L (ref 96–108)
CO2 SERPL-SCNC: 33 MMOL/L (ref 21–32)
CREAT SERPL-MCNC: 0.88 MG/DL (ref 0.6–1.3)
EOSINOPHIL # BLD AUTO: 0.19 THOUSAND/ÂΜL (ref 0–0.61)
EOSINOPHIL NFR BLD AUTO: 4 % (ref 0–6)
ERYTHROCYTE [DISTWIDTH] IN BLOOD BY AUTOMATED COUNT: 13.1 % (ref 11.6–15.1)
GFR SERPL CREATININE-BSD FRML MDRD: 85 ML/MIN/1.73SQ M
GLUCOSE P FAST SERPL-MCNC: 89 MG/DL (ref 65–99)
HCT VFR BLD AUTO: 47.8 % (ref 36.5–49.3)
HGB BLD-MCNC: 15.5 G/DL (ref 12–17)
IMM GRANULOCYTES # BLD AUTO: 0.01 THOUSAND/UL (ref 0–0.2)
IMM GRANULOCYTES NFR BLD AUTO: 0 % (ref 0–2)
LYMPHOCYTES # BLD AUTO: 1.52 THOUSANDS/ÂΜL (ref 0.6–4.47)
LYMPHOCYTES NFR BLD AUTO: 35 % (ref 14–44)
MCH RBC QN AUTO: 29.4 PG (ref 26.8–34.3)
MCHC RBC AUTO-ENTMCNC: 32.4 G/DL (ref 31.4–37.4)
MCV RBC AUTO: 91 FL (ref 82–98)
MONOCYTES # BLD AUTO: 0.29 THOUSAND/ÂΜL (ref 0.17–1.22)
MONOCYTES NFR BLD AUTO: 7 % (ref 4–12)
NEUTROPHILS # BLD AUTO: 2.35 THOUSANDS/ÂΜL (ref 1.85–7.62)
NEUTS SEG NFR BLD AUTO: 53 % (ref 43–75)
NRBC BLD AUTO-RTO: 0 /100 WBCS
PLATELET # BLD AUTO: 134 THOUSANDS/UL (ref 149–390)
PMV BLD AUTO: 11.4 FL (ref 8.9–12.7)
POTASSIUM SERPL-SCNC: 4.6 MMOL/L (ref 3.5–5.3)
PROT SERPL-MCNC: 7.7 G/DL (ref 6.4–8.4)
RBC # BLD AUTO: 5.27 MILLION/UL (ref 3.88–5.62)
SODIUM SERPL-SCNC: 144 MMOL/L (ref 135–147)
WBC # BLD AUTO: 4.41 THOUSAND/UL (ref 4.31–10.16)

## 2024-04-10 PROCEDURE — 36415 COLL VENOUS BLD VENIPUNCTURE: CPT

## 2024-04-10 PROCEDURE — 80053 COMPREHEN METABOLIC PANEL: CPT

## 2024-04-10 PROCEDURE — 85025 COMPLETE CBC W/AUTO DIFF WBC: CPT

## 2024-04-19 ENCOUNTER — TELEPHONE (OUTPATIENT)
Age: 74
End: 2024-04-19

## 2024-04-19 DIAGNOSIS — K40.90 RIGHT INGUINAL HERNIA: Primary | ICD-10-CM

## 2024-04-19 NOTE — TELEPHONE ENCOUNTER
Patient requesting a referral to a surgeon for right inguinal hernia repair.    Dr Cedeño placed order referred to  General Surgery.  Patient notified.

## 2024-04-24 ENCOUNTER — PREP FOR PROCEDURE (OUTPATIENT)
Dept: SURGERY | Facility: CLINIC | Age: 74
End: 2024-04-24

## 2024-04-24 ENCOUNTER — TELEPHONE (OUTPATIENT)
Age: 74
End: 2024-04-24

## 2024-04-24 ENCOUNTER — CONSULT (OUTPATIENT)
Dept: SURGERY | Facility: CLINIC | Age: 74
End: 2024-04-24
Payer: MEDICARE

## 2024-04-24 VITALS
WEIGHT: 155 LBS | HEIGHT: 65 IN | HEART RATE: 72 BPM | BODY MASS INDEX: 25.83 KG/M2 | DIASTOLIC BLOOD PRESSURE: 83 MMHG | SYSTOLIC BLOOD PRESSURE: 169 MMHG

## 2024-04-24 DIAGNOSIS — K40.90 INGUINAL HERNIA: Primary | ICD-10-CM

## 2024-04-24 DIAGNOSIS — K40.90 RIGHT INGUINAL HERNIA: ICD-10-CM

## 2024-04-24 PROCEDURE — 99204 OFFICE O/P NEW MOD 45 MIN: CPT | Performed by: SURGERY

## 2024-04-24 NOTE — H&P (VIEW-ONLY)
Assessment/Plan:    Diagnoses and all orders for this visit:    Right inguinal hernia  -     Ambulatory Referral to General Surgery    Risks and benefits of an open right inguinal hernia repair were discussed with him including the potential for spermatic cord injury, recurrence, pain issues, or bladder injury and he agrees to proceed.  Recently had some blood work which looks fine.  Will need an EKG preoperatively    Subjective:      Patient ID: Srinivas Rao is a 73 y.o. male.    Patient presents for right inguinal hernia consult.  States he has had a bulge and intermittent achy pain RLQ for 10 years.  Does not limit his activities.  CT abdomen pelvis 3/25/2024   IMPRESSION:  Moderate to large right inguinal hernia containing fat and a small portion of the urinary bladder.    Occasionally gets some pain in the right groin.  Desires repair  History of a left inguinal hernia repair 15 years ago.                 The following portions of the patient's history were reviewed and updated as appropriate:     He  has a past medical history of Hypercholesterolemia and Hypertension.  He  has a past surgical history that includes Hernia repair.  His family history includes Hypertension in his mother.  He  reports that he has never smoked. He has never used smokeless tobacco. He reports that he does not drink alcohol and does not use drugs.  Current Outpatient Medications   Medication Sig Dispense Refill    amLODIPine (NORVASC) 10 mg tablet Take 1 tablet (10 mg total) by mouth daily 90 tablet 1    carboxymethylcellulose (REFRESH PLUS) 0.5 % SOLN INSTILL 1 DROP BOTH EYES THREE TIMES A DAY FOR DRY EYES      Cholecalciferol 125 MCG (5000 UT) capsule Take 1 capsule by mouth daily      Coenzyme Q10 300 MG CAPS TAKE 300MG BY MOUTH EVERY DAY      Diclofenac Sodium (VOLTAREN) 1 % APPLY 2GM TO UPPER EXTREMITIES TOPICALLY THREE TIMES A DAY AS NEEDED FOR PAIN AND INFLAMMATION **USE DOSING CARD** (DO NOT EXCEED 16 GRAMS DAILY TO ANY  "AFFECTED JOINT OF THE LOWER EXTREMITIES. DO NOT EXCEED 8 GRAMS DAILY TO ANY AFFECTED JOINT OF THE UPPER EXTREMITIES. DO NOT EXCEED A TOTAL DOSE OF 32 GRAMS DAILY OVER ALL JOINTS)      fluticasone (FLONASE) 50 mcg/act nasal spray 1 spray into each nostril daily 48 g 1    metoprolol succinate (TOPROL-XL) 25 mg 24 hr tablet Take 1 tablet (25 mg total) by mouth daily 90 tablet 1    Multiple Vitamins-Minerals (Multivitamin-Minerals) TABS Take 1 tablet by mouth daily      simvastatin (ZOCOR) 20 mg tablet Take 1 tablet (20 mg total) by mouth daily 90 tablet 1    terazosin (HYTRIN) 2 mg capsule Take 1 capsule (2 mg total) by mouth daily (Patient taking differently: Take 4 mg by mouth daily) 90 capsule 1    valsartan (DIOVAN) 80 mg tablet Take 1 tablet (80 mg total) by mouth daily 90 tablet 3     No current facility-administered medications for this visit.     He has No Known Allergies..    Review of Systems   Gastrointestinal:  Positive for abdominal pain.         Objective:      /83   Pulse 72   Ht 5' 5\" (1.651 m)   Wt 70.3 kg (155 lb)   BMI 25.79 kg/m²          Physical Exam  Constitutional:       General: He is not in acute distress.  HENT:      Head: Normocephalic.      Mouth/Throat:      Mouth: Mucous membranes are moist.   Eyes:      Extraocular Movements: Extraocular movements intact.   Cardiovascular:      Rate and Rhythm: Normal rate.   Pulmonary:      Effort: Pulmonary effort is normal.   Abdominal:      General: Abdomen is flat.      Palpations: Abdomen is soft.      Hernia: A hernia (Reducible right inguinal hernia.  No signs of a left inguinal hernia.) is present.   Musculoskeletal:      Cervical back: Normal range of motion.   Skin:     General: Skin is warm and dry.   Neurological:      Mental Status: He is alert.      Extremities: No edema  "

## 2024-04-26 NOTE — TELEPHONE ENCOUNTER
Left message for patient to call back regarding his phone call to office on 4/24/24.  It is unclear what he needs.

## 2024-04-29 ENCOUNTER — ANESTHESIA EVENT (OUTPATIENT)
Dept: PERIOP | Facility: AMBULARY SURGERY CENTER | Age: 74
End: 2024-04-29
Payer: MEDICARE

## 2024-04-30 ENCOUNTER — OFFICE VISIT (OUTPATIENT)
Dept: LAB | Facility: CLINIC | Age: 74
End: 2024-04-30
Payer: MEDICARE

## 2024-04-30 DIAGNOSIS — K40.90 INGUINAL HERNIA: ICD-10-CM

## 2024-04-30 PROCEDURE — 93005 ELECTROCARDIOGRAM TRACING: CPT

## 2024-05-02 NOTE — PRE-PROCEDURE INSTRUCTIONS
Pre-Surgery Instructions:   Medication Instructions    amLODIPine (NORVASC) 10 mg tablet Take day of surgery.    Cholecalciferol 125 MCG (5000 UT) capsule Hold until after surgery    Coenzyme Q10 300 MG CAPS Hold until after surgery    fluticasone (FLONASE) 50 mcg/act nasal spray Take day of surgery.    metoprolol succinate (TOPROL-XL) 25 mg 24 hr tablet Take day of surgery.    Multiple Vitamins-Minerals (Multivitamin-Minerals) TABS Hold until after surgery    simvastatin (ZOCOR) 20 mg tablet Take night before surgery    terazosin (HYTRIN) 2 mg capsule Take night before surgery    valsartan (DIOVAN) 80 mg tablet Hold day of surgery.      Medication instructions for day surgery reviewed. Please use only a sip of water to take your instructed medications. Avoid all over the counter vitamins, supplements and NSAIDS for one week prior to surgery per anesthesia guidelines. Tylenol is ok to take as needed.     You will receive a call one business day prior to surgery with an arrival time and hospital directions. If your surgery is scheduled on a Monday, the hospital will be calling you on the Friday prior to your surgery. If you have not heard from anyone by 8pm, please call the hospital supervisor through the hospital  at [phone]. ([place] [phone] or [place] [phone]).    Do not eat or drink anything after midnight the night before your surgery, including candy, mints, lifesavers, or chewing gum. Do not drink alcohol 24hrs before your surgery. Try not to smoke at least 24hrs before your surgery.       Follow the pre surgery showering instructions as listed in the “My Surgical Experience Booklet” or otherwise provided by your surgeon's office. Do not use a blade to shave the surgical area 1 week before surgery. It is okay to use a clean electric clippers up to 24 hours before surgery. Do not apply any lotions, creams, including makeup, cologne, deodorant, or perfumes after showering on the day of  your surgery. Do not use dry shampoo, hair spray, hair gel, or any type of hair products.     No contact lenses, eye make-up, or artificial eyelashes. Remove nail polish, including gel polish, and any artificial, gel, or acrylic nails if possible. Remove all jewelry including rings and body piercing jewelry.     Wear causal clothing that is easy to take on and off. Consider your type of surgery.    Keep any valuables, jewelry, piercings at home. Please bring any specially ordered equipment (sling, braces) if indicated.    Arrange for a responsible person to drive you to and from the hospital on the day of your surgery. Please confirm the visitor policy for the day of your procedure when you receive your phone call with an arrival time.     Call the surgeon's office with any new illnesses, exposures, or additional questions prior to surgery.    Please reference your “My Surgical Experience Booklet” for additional information to prepare for your upcoming surgery.

## 2024-05-05 LAB
ATRIAL RATE: 64 BPM
P AXIS: -4 DEGREES
PR INTERVAL: 218 MS
QRS AXIS: -24 DEGREES
QRSD INTERVAL: 102 MS
QT INTERVAL: 410 MS
QTC INTERVAL: 422 MS
T WAVE AXIS: -9 DEGREES
VENTRICULAR RATE: 64 BPM

## 2024-05-05 PROCEDURE — 93010 ELECTROCARDIOGRAM REPORT: CPT | Performed by: INTERNAL MEDICINE

## 2024-05-06 ENCOUNTER — HOSPITAL ENCOUNTER (OUTPATIENT)
Facility: AMBULARY SURGERY CENTER | Age: 74
Setting detail: OUTPATIENT SURGERY
Discharge: HOME | End: 2024-05-06
Attending: SURGERY | Admitting: SURGERY
Payer: MEDICARE

## 2024-05-06 ENCOUNTER — ANESTHESIA (OUTPATIENT)
Dept: PERIOP | Facility: AMBULARY SURGERY CENTER | Age: 74
End: 2024-05-06
Payer: MEDICARE

## 2024-05-06 VITALS
RESPIRATION RATE: 16 BRPM | OXYGEN SATURATION: 98 % | BODY MASS INDEX: 24.83 KG/M2 | SYSTOLIC BLOOD PRESSURE: 144 MMHG | WEIGHT: 149 LBS | HEIGHT: 65 IN | DIASTOLIC BLOOD PRESSURE: 65 MMHG | HEART RATE: 65 BPM | TEMPERATURE: 98 F

## 2024-05-06 DIAGNOSIS — K40.90 RIGHT INGUINAL HERNIA: Primary | ICD-10-CM

## 2024-05-06 PROCEDURE — 49505 PRP I/HERN INIT REDUC >5 YR: CPT | Performed by: SURGERY

## 2024-05-06 PROCEDURE — C1781 MESH (IMPLANTABLE): HCPCS | Performed by: SURGERY

## 2024-05-06 DEVICE — MODIFIED ONFLEX MESH
Type: IMPLANTABLE DEVICE | Site: INGUINAL | Status: FUNCTIONAL
Brand: MODIFIED ONFLEX MESH

## 2024-05-06 RX ORDER — OXYCODONE HYDROCHLORIDE 5 MG/1
5 TABLET ORAL EVERY 4 HOURS PRN
Status: DISCONTINUED | OUTPATIENT
Start: 2024-05-06 | End: 2024-05-06 | Stop reason: HOSPADM

## 2024-05-06 RX ORDER — OXYCODONE HYDROCHLORIDE 5 MG/1
5 TABLET ORAL EVERY 4 HOURS PRN
Qty: 10 TABLET | Refills: 0 | Status: SHIPPED | OUTPATIENT
Start: 2024-05-06 | End: 2024-05-16

## 2024-05-06 RX ORDER — FENTANYL CITRATE/PF 50 MCG/ML
50 SYRINGE (ML) INJECTION
Status: DISCONTINUED | OUTPATIENT
Start: 2024-05-06 | End: 2024-05-06 | Stop reason: HOSPADM

## 2024-05-06 RX ORDER — SODIUM CHLORIDE, SODIUM LACTATE, POTASSIUM CHLORIDE, CALCIUM CHLORIDE 600; 310; 30; 20 MG/100ML; MG/100ML; MG/100ML; MG/100ML
20 INJECTION, SOLUTION INTRAVENOUS CONTINUOUS
Status: DISCONTINUED | OUTPATIENT
Start: 2024-05-06 | End: 2024-05-06 | Stop reason: HOSPADM

## 2024-05-06 RX ORDER — ONDANSETRON 2 MG/ML
4 INJECTION INTRAMUSCULAR; INTRAVENOUS EVERY 4 HOURS PRN
Status: DISCONTINUED | OUTPATIENT
Start: 2024-05-06 | End: 2024-05-06 | Stop reason: HOSPADM

## 2024-05-06 RX ORDER — PROPOFOL 10 MG/ML
INJECTION, EMULSION INTRAVENOUS AS NEEDED
Status: DISCONTINUED | OUTPATIENT
Start: 2024-05-06 | End: 2024-05-06

## 2024-05-06 RX ORDER — SODIUM CHLORIDE, SODIUM LACTATE, POTASSIUM CHLORIDE, CALCIUM CHLORIDE 600; 310; 30; 20 MG/100ML; MG/100ML; MG/100ML; MG/100ML
125 INJECTION, SOLUTION INTRAVENOUS CONTINUOUS
Status: DISCONTINUED | OUTPATIENT
Start: 2024-05-06 | End: 2024-05-06 | Stop reason: HOSPADM

## 2024-05-06 RX ORDER — SODIUM CHLORIDE, SODIUM LACTATE, POTASSIUM CHLORIDE, CALCIUM CHLORIDE 600; 310; 30; 20 MG/100ML; MG/100ML; MG/100ML; MG/100ML
INJECTION, SOLUTION INTRAVENOUS CONTINUOUS PRN
Status: DISCONTINUED | OUTPATIENT
Start: 2024-05-06 | End: 2024-05-06

## 2024-05-06 RX ORDER — DEXAMETHASONE SODIUM PHOSPHATE 10 MG/ML
INJECTION, SOLUTION INTRAMUSCULAR; INTRAVENOUS AS NEEDED
Status: DISCONTINUED | OUTPATIENT
Start: 2024-05-06 | End: 2024-05-06

## 2024-05-06 RX ORDER — HYDROMORPHONE HCL/PF 1 MG/ML
0.5 SYRINGE (ML) INJECTION
Status: DISCONTINUED | OUTPATIENT
Start: 2024-05-06 | End: 2024-05-06 | Stop reason: HOSPADM

## 2024-05-06 RX ORDER — CEFAZOLIN SODIUM 2 G/50ML
2000 SOLUTION INTRAVENOUS ONCE
Status: COMPLETED | OUTPATIENT
Start: 2024-05-06 | End: 2024-05-06

## 2024-05-06 RX ORDER — BUPIVACAINE HYDROCHLORIDE 2.5 MG/ML
INJECTION, SOLUTION EPIDURAL; INFILTRATION; INTRACAUDAL AS NEEDED
Status: DISCONTINUED | OUTPATIENT
Start: 2024-05-06 | End: 2024-05-06 | Stop reason: HOSPADM

## 2024-05-06 RX ORDER — FENTANYL CITRATE 50 UG/ML
INJECTION, SOLUTION INTRAMUSCULAR; INTRAVENOUS AS NEEDED
Status: DISCONTINUED | OUTPATIENT
Start: 2024-05-06 | End: 2024-05-06

## 2024-05-06 RX ORDER — ONDANSETRON 2 MG/ML
INJECTION INTRAMUSCULAR; INTRAVENOUS AS NEEDED
Status: DISCONTINUED | OUTPATIENT
Start: 2024-05-06 | End: 2024-05-06

## 2024-05-06 RX ORDER — HYDROMORPHONE HCL/PF 1 MG/ML
0.2 SYRINGE (ML) INJECTION
Status: DISCONTINUED | OUTPATIENT
Start: 2024-05-06 | End: 2024-05-06 | Stop reason: HOSPADM

## 2024-05-06 RX ORDER — EPHEDRINE SULFATE 50 MG/ML
INJECTION INTRAVENOUS AS NEEDED
Status: DISCONTINUED | OUTPATIENT
Start: 2024-05-06 | End: 2024-05-06

## 2024-05-06 RX ORDER — ONDANSETRON 2 MG/ML
4 INJECTION INTRAMUSCULAR; INTRAVENOUS ONCE AS NEEDED
Status: DISCONTINUED | OUTPATIENT
Start: 2024-05-06 | End: 2024-05-06 | Stop reason: HOSPADM

## 2024-05-06 RX ORDER — LIDOCAINE HYDROCHLORIDE 10 MG/ML
INJECTION, SOLUTION EPIDURAL; INFILTRATION; INTRACAUDAL; PERINEURAL AS NEEDED
Status: DISCONTINUED | OUTPATIENT
Start: 2024-05-06 | End: 2024-05-06

## 2024-05-06 RX ADMIN — SODIUM CHLORIDE, SODIUM LACTATE, POTASSIUM CHLORIDE, AND CALCIUM CHLORIDE: .6; .31; .03; .02 INJECTION, SOLUTION INTRAVENOUS at 12:57

## 2024-05-06 RX ADMIN — FENTANYL CITRATE 25 MCG: 50 INJECTION INTRAMUSCULAR; INTRAVENOUS at 13:31

## 2024-05-06 RX ADMIN — FENTANYL CITRATE 25 MCG: 50 INJECTION INTRAMUSCULAR; INTRAVENOUS at 13:47

## 2024-05-06 RX ADMIN — EPHEDRINE SULFATE 10 MG: 50 INJECTION INTRAVENOUS at 13:15

## 2024-05-06 RX ADMIN — ONDANSETRON 4 MG: 2 INJECTION INTRAMUSCULAR; INTRAVENOUS at 13:50

## 2024-05-06 RX ADMIN — FENTANYL CITRATE 25 MCG: 50 INJECTION INTRAMUSCULAR; INTRAVENOUS at 13:10

## 2024-05-06 RX ADMIN — DEXAMETHASONE SODIUM PHOSPHATE 5 MG: 10 INJECTION, SOLUTION INTRAMUSCULAR; INTRAVENOUS at 13:04

## 2024-05-06 RX ADMIN — CEFAZOLIN SODIUM 2000 MG: 2 SOLUTION INTRAVENOUS at 13:07

## 2024-05-06 RX ADMIN — OXYCODONE HYDROCHLORIDE 5 MG: 5 TABLET ORAL at 15:10

## 2024-05-06 RX ADMIN — PROPOFOL 200 MG: 10 INJECTION, EMULSION INTRAVENOUS at 13:02

## 2024-05-06 RX ADMIN — PROPOFOL 100 MG: 10 INJECTION, EMULSION INTRAVENOUS at 13:04

## 2024-05-06 RX ADMIN — LIDOCAINE HYDROCHLORIDE 50 MG: 10 INJECTION, SOLUTION EPIDURAL; INFILTRATION; INTRACAUDAL; PERINEURAL at 13:02

## 2024-05-06 NOTE — ANESTHESIA POSTPROCEDURE EVALUATION
Post-Op Assessment Note    CV Status:  Stable    Pain management: adequate       Mental Status:  Arousable   Hydration Status:  Euvolemic   PONV Controlled:  Controlled   Airway Patency:  Patent     Post Op Vitals Reviewed: Yes    No anethesia notable event occurred.    Staff: CRNA               /66 (05/06/24 1359)    Temp (!) 97.3 °F (36.3 °C) (05/06/24 1359)    Pulse (!) 53 (05/06/24 1359)   Resp   12   SpO2   100% 6L fm

## 2024-05-06 NOTE — ANESTHESIA PREPROCEDURE EVALUATION
Procedure:  REPAIR HERNIA INGUINAL (Right: Groin)    Relevant Problems   CARDIO   (+) Benign essential hypertension   (+) Hypercholesterolemia      /RENAL   (+) Benign prostatic hyperplasia with lower urinary tract symptoms      HEMATOLOGY   (+) Thrombocytopenia (HCC)      PULMONARY   (+) Dyspnea, unspecified        Physical Exam    Airway    Mallampati score: II         Dental       Cardiovascular  Cardiovascular exam normal    Pulmonary  Pulmonary exam normal     Other Findings        Anesthesia Plan  ASA Score- 2     Anesthesia Type- general with ASA Monitors.         Additional Monitors:     Airway Plan: LMA.           Plan Factors-Exercise tolerance (METS): >4 METS.    Chart reviewed.    Patient summary reviewed.    Patient is not a current smoker. Patient not instructed to abstain from smoking on day of procedure. Patient did not smoke on day of surgery.            Induction- intravenous.    Postoperative Plan-     Informed Consent- Anesthetic plan and risks discussed with patient.  I personally reviewed this patient with the CRNA. Discussed and agreed on the Anesthesia Plan with the CRNA..

## 2024-05-06 NOTE — INTERVAL H&P NOTE
H&P reviewed. After examining the patient I find no changes in the patients condition since the H&P had been written.    Vitals:    05/06/24 1223   BP: 169/92   Pulse: 63   Resp: 18   Temp: 97.6 °F (36.4 °C)   SpO2: 96%

## 2024-05-06 NOTE — OP NOTE
OPERATIVE REPORT  PATIENT NAME: Srinivas Rao    :  1950  MRN: [id number]  Pt [facility]    SURGERY DATE: 2024    Surgeons and Role:     * Alcon Almodovar,  - Primary     * Jeanmarie Gayle - Assisting    Preop Diagnosis:  Inguinal hernia [K40.90], right    Post-Op Diagnosis Codes:     * Inguinal hernia [K40.90], right    Procedure(s):  Right - REPAIR HERNIA INGUINAL, with medium on flex mesh    Specimen(s):  * No specimens in log *    Estimated Blood Loss:   Minimal    Drains:  * No LDAs found *    Anesthesia Type:   General    Operative Indications:  Inguinal hernia [K40.90]      Operative Findings:  Direct right inguinal hernia involving lateral aspect of the bladder.  This is intact and reduced.  Height 65 inches weight 67 kg / 149 pounds.  BMI 25.  ASA 2.  Wound class I    Complications:   None    Procedure and Technique:  Was brought the operative suite and identified visualization, conversation, by armband.  Sequential compression pumps were placed.  He was given Ancef perioperatively.  Once under anesthesia abdomen was prepped and draped in a sterile fashion.  Timeout was performed and was assured that the prep was dry.  Local was instilled over the right inguinal canal.  An oblique skin incision was made and the subcutaneous tissues were divided using hot cautery.  External ring was noted.  Tissues were then dissected off the external bleak fascia.  This fascia was opened up through the external ring to level the internal ring.  Back retractors placed for exposure.  Cremasteric fibers were then skeletonized off the cord structures.  Cord was encircled with a Penrose drain.  Spermatic cord was skeletonized and there is no signs of an indirect hernia.  There was an obvious direct inguinal hernia.  As per his CT were quite careful in dissecting out the hernia through the direct defect as this contained bladder.  This was freed up and a 4 x 4 sponge was utilized to reduce the  hernia contents and develop the preperitoneal space.  A medium on flex mesh was chosen.  I did trim away the top 2 cm the preperitoneal component.  This was placed and adequately covered the defect in the preperitoneal space.  2-0 Vicryl was used anchored to each side.  [place]lay mesh was placed on the floor of the canal and anchored with 2-0 Vicryl to the pubic tubercle, shelving edge, and conjoined tendon.  An extended cut from the superior aspect of the mesh down to the cord structures.  The resultant 2 tails were brought around the cord and anchored to themselves as well as the underlying transversalis tissues with 2-0 Vicryl.  Excess tails were trimmed remainder tucked under the external bleak fascia.  Irrigations carried out.  Local was instilled.  2-0 Vicryl was used to reapproximate the external bleak fascia.  2-0 Vicryl was used to reapproximate Sheba's.  Skin was closing 4-0 Monocryl in a subcuticular fashion.  Wound was washed and dried.  Sterile skin glue was applied.  Was assured the testicles in the scrotum at the completion of the case.  He was awakened in the operating room returned to the recovery area in stable condition having tolerated the procedure well.   I was present for the entire procedure.    Patient Disposition:  PACU         SIGNATURE: Alcon Almodovar,   DATE: May 6, 2024  TIME: 1:45 PM

## 2024-05-22 ENCOUNTER — OFFICE VISIT (OUTPATIENT)
Dept: SURGERY | Facility: CLINIC | Age: 74
End: 2024-05-22

## 2024-05-22 DIAGNOSIS — K40.90 RIGHT INGUINAL HERNIA: Primary | ICD-10-CM

## 2024-05-22 PROCEDURE — 99024 POSTOP FOLLOW-UP VISIT: CPT | Performed by: SURGERY

## 2024-05-22 NOTE — PROGRESS NOTES
Assessment/Plan:    Diagnoses and all orders for this visit:    Right inguinal hernia    Status post right inguinal hernia repair.  Quite well.  Healing ridge as expected.  May resume unrestricted activities as of June 6      Postoperative restrictions reviewed. All questions answered.       ______________________________________________________  HPI: Patient presents post operatively.  Right inguinal hernia repair 5/6/2024.  Had some pain that first week after surgery as expected.  Overall doing quite well now.               ROS:  General ROS: negative for - chills, fatigue, fever or night sweats, weight loss  Respiratory ROS: no cough, shortness of breath, or wheezing  Cardiovascular ROS: no chest pain or dyspnea on exertion  Genito-Urinary ROS: no dysuria, trouble voiding, or hematuria  Musculoskeletal ROS: negative for - gait disturbance, joint pain or muscle pain  Neurological ROS: no TIA or stroke symptoms  GI ROS: see HPI  Skin ROS: no new rashes or lesions   Lymphatic ROS: no new adenopathy noted by pt.   GYN ROS: see HPI, no new GYN history or bleeding noted  Psy ROS: no new mental or behavioral disturbances         Patient Active Problem List   Diagnosis    Benign essential hypertension    Hypercholesterolemia    Nocturia    Thrombocytopenia (HCC)    Other age-related cataract    Sjogren's syndrome (HCC)    Gilbert's syndrome    Seasonal allergies    Benign prostatic hyperplasia with lower urinary tract symptoms    Dyspnea, unspecified    Eyelid abnormality    Pre-op exam    Right inguinal hernia       Allergies:  Patient has no known allergies.      Current Outpatient Medications:     amLODIPine (NORVASC) 10 mg tablet, Take 1 tablet (10 mg total) by mouth daily, Disp: 90 tablet, Rfl: 1    carboxymethylcellulose (REFRESH PLUS) 0.5 % SOLN, INSTILL 1 DROP BOTH EYES THREE TIMES A DAY FOR DRY EYES, Disp: , Rfl:     Cholecalciferol 125 MCG (5000 UT) capsule, Take 1 capsule by mouth daily, Disp: , Rfl:      Coenzyme Q10 300 MG CAPS, TAKE 300MG BY MOUTH EVERY DAY, Disp: , Rfl:     Diclofenac Sodium (VOLTAREN) 1 %, APPLY 2GM TO UPPER EXTREMITIES TOPICALLY THREE TIMES A DAY AS NEEDED FOR PAIN AND INFLAMMATION **USE DOSING CARD** (DO NOT EXCEED 16 GRAMS DAILY TO ANY AFFECTED JOINT OF THE LOWER EXTREMITIES. DO NOT EXCEED 8 GRAMS DAILY TO ANY AFFECTED JOINT OF THE UPPER EXTREMITIES. DO NOT EXCEED A TOTAL DOSE OF 32 GRAMS DAILY OVER ALL JOINTS), Disp: , Rfl:     fluticasone (FLONASE) 50 mcg/act nasal spray, 1 spray into each nostril daily, Disp: 48 g, Rfl: 1    metoprolol succinate (TOPROL-XL) 25 mg 24 hr tablet, Take 1 tablet (25 mg total) by mouth daily, Disp: 90 tablet, Rfl: 1    Multiple Vitamins-Minerals (Multivitamin-Minerals) TABS, Take 1 tablet by mouth daily, Disp: , Rfl:     simvastatin (ZOCOR) 20 mg tablet, Take 1 tablet (20 mg total) by mouth daily, Disp: 90 tablet, Rfl: 1    terazosin (HYTRIN) 2 mg capsule, Take 1 capsule (2 mg total) by mouth daily (Patient taking differently: Take 4 mg by mouth daily), Disp: 90 capsule, Rfl: 1    valsartan (DIOVAN) 80 mg tablet, Take 1 tablet (80 mg total) by mouth daily, Disp: 90 tablet, Rfl: 3    Past Medical History:   Diagnosis Date    Hypercholesterolemia     Hypertension        Past Surgical History:   Procedure Laterality Date    HERNIA REPAIR      [place] RPR 1ST INGUN HRNA AGE 6 YRS/> REDUCIBLE Right 5/6/2024    Procedure: REPAIR HERNIA INGUINAL;  Surgeon: Alcon Almodovar DO;  [facility] MAIN [facility];  Service: General       Family History   Problem Relation Age of Onset    Hypertension Mother         benign essential        reports that he has never smoked. He has never used smokeless tobacco. He reports that he does not drink alcohol and does not use drugs.    PHYSICAL EXAM    There were no vitals taken for this visit.    General: normal, cooperative, no distress  Abdominal: soft, nondistended, or nontender  Incision: clean, dry, and intact and healing well.   Healing ridge as expected.  Healing well      Alcon Almodovar, DO    Date: 5/22/2024 Time: 9:15 AM

## 2024-05-22 NOTE — LETTER
May 22, 2024     Wali Cedeño MD  [address] [place] [address]    Patient: Srinivas Rao   YOB: 1950   Date of Visit: 5/22/2024       Dear Dr. Cedeño:    Thank you for referring Srinivas Rao to me for evaluation. Below are my notes for this consultation.    If you have questions, please do not hesitate to call me. I look forward to following your patient along with you.         Sincerely,        Alcon Almodovar DO        CC: No Recipients    Alcon Almodovar DO  5/22/2024  9:16 AM  Sign when Signing Visit  Assessment/Plan:    Diagnoses and all orders for this visit:    Right inguinal hernia    Status post right inguinal hernia repair.  Quite well.  Healing ridge as expected.  May resume unrestricted activities as of June 6      Postoperative restrictions reviewed. All questions answered.       ______________________________________________________  HPI: Patient presents post operatively.  Right inguinal hernia repair 5/6/2024.  Had some pain that first week after surgery as expected.  Overall doing quite well now.               ROS:  General ROS: negative for - chills, fatigue, fever or night sweats, weight loss  Respiratory ROS: no cough, shortness of breath, or wheezing  Cardiovascular ROS: no chest pain or dyspnea on exertion  Genito-Urinary ROS: no dysuria, trouble voiding, or hematuria  Musculoskeletal ROS: negative for - gait disturbance, joint pain or muscle pain  Neurological ROS: no TIA or stroke symptoms  GI ROS: see HPI  Skin ROS: no new rashes or lesions   Lymphatic ROS: no new adenopathy noted by pt.   GYN ROS: see HPI, no new GYN history or bleeding noted  Psy ROS: no new mental or behavioral disturbances         Patient Active Problem List   Diagnosis   • Benign essential hypertension   • Hypercholesterolemia   • Nocturia   • Thrombocytopenia (HCC)   • Other age-related cataract   • Sjogren's syndrome (HCC)   • Gilbert's syndrome   •  Seasonal allergies   • Benign prostatic hyperplasia with lower urinary tract symptoms   • Dyspnea, unspecified   • Eyelid abnormality   • Pre-op exam   • Right inguinal hernia       Allergies:  Patient has no known allergies.      Current Outpatient Medications:   •  amLODIPine (NORVASC) 10 mg tablet, Take 1 tablet (10 mg total) by mouth daily, Disp: 90 tablet, Rfl: 1  •  carboxymethylcellulose (REFRESH PLUS) 0.5 % SOLN, INSTILL 1 DROP BOTH EYES THREE TIMES A DAY FOR DRY EYES, Disp: , Rfl:   •  Cholecalciferol 125 MCG (5000 UT) capsule, Take 1 capsule by mouth daily, Disp: , Rfl:   •  Coenzyme Q10 300 MG CAPS, TAKE 300MG BY MOUTH EVERY DAY, Disp: , Rfl:   •  Diclofenac Sodium (VOLTAREN) 1 %, APPLY 2GM TO UPPER EXTREMITIES TOPICALLY THREE TIMES A DAY AS NEEDED FOR PAIN AND INFLAMMATION **USE DOSING CARD** (DO NOT EXCEED 16 GRAMS DAILY TO ANY AFFECTED JOINT OF THE LOWER EXTREMITIES. DO NOT EXCEED 8 GRAMS DAILY TO ANY AFFECTED JOINT OF THE UPPER EXTREMITIES. DO NOT EXCEED A TOTAL DOSE OF 32 GRAMS DAILY OVER ALL JOINTS), Disp: , Rfl:   •  fluticasone (FLONASE) 50 mcg/act nasal spray, 1 spray into each nostril daily, Disp: 48 g, Rfl: 1  •  metoprolol succinate (TOPROL-XL) 25 mg 24 hr tablet, Take 1 tablet (25 mg total) by mouth daily, Disp: 90 tablet, Rfl: 1  •  Multiple Vitamins-Minerals (Multivitamin-Minerals) TABS, Take 1 tablet by mouth daily, Disp: , Rfl:   •  simvastatin (ZOCOR) 20 mg tablet, Take 1 tablet (20 mg total) by mouth daily, Disp: 90 tablet, Rfl: 1  •  terazosin (HYTRIN) 2 mg capsule, Take 1 capsule (2 mg total) by mouth daily (Patient taking differently: Take 4 mg by mouth daily), Disp: 90 capsule, Rfl: 1  •  valsartan (DIOVAN) 80 mg tablet, Take 1 tablet (80 mg total) by mouth daily, Disp: 90 tablet, Rfl: 3    Past Medical History:   Diagnosis Date   • Hypercholesterolemia    • Hypertension        Past Surgical History:   Procedure Laterality Date   • HERNIA REPAIR     • [place] RPR 1ST INGUN HRNA AGE 6  YRS/> REDUCIBLE Right 5/6/2024    Procedure: REPAIR HERNIA INGUINAL;  Surgeon: Alcon Almodovar DO;  [facility] MAIN OR;  Service: General       Family History   Problem Relation Age of Onset   • Hypertension Mother         benign essential        reports that he has never smoked. He has never used smokeless tobacco. He reports that he does not drink alcohol and does not use drugs.    PHYSICAL EXAM    There were no vitals taken for this visit.    General: normal, cooperative, no distress  Abdominal: soft, nondistended, or nontender  Incision: clean, dry, and intact and healing well.  Healing ridge as expected.  Healing well      Alcon Almodovar DO    Date: 5/22/2024 Time: 9:15 AM

## 2024-09-23 ENCOUNTER — TELEPHONE (OUTPATIENT)
Age: 74
End: 2024-09-23

## 2024-10-02 ENCOUNTER — RA CDI HCC (OUTPATIENT)
Dept: OTHER | Facility: HOSPITAL | Age: 74
End: 2024-10-02

## 2024-10-16 ENCOUNTER — OFFICE VISIT (OUTPATIENT)
Age: 74
End: 2024-10-16
Payer: MEDICARE

## 2024-10-16 VITALS
HEART RATE: 62 BPM | DIASTOLIC BLOOD PRESSURE: 76 MMHG | TEMPERATURE: 98.6 F | WEIGHT: 152 LBS | HEIGHT: 65 IN | BODY MASS INDEX: 25.33 KG/M2 | OXYGEN SATURATION: 98 % | SYSTOLIC BLOOD PRESSURE: 130 MMHG

## 2024-10-16 DIAGNOSIS — Z12.5 SCREENING FOR PROSTATE CANCER: ICD-10-CM

## 2024-10-16 DIAGNOSIS — R35.1 BENIGN PROSTATIC HYPERPLASIA WITH NOCTURIA: ICD-10-CM

## 2024-10-16 DIAGNOSIS — E78.00 HYPERCHOLESTEROLEMIA: ICD-10-CM

## 2024-10-16 DIAGNOSIS — Z12.11 ENCOUNTER FOR SCREENING FOR MALIGNANT NEOPLASM OF COLON: Primary | ICD-10-CM

## 2024-10-16 DIAGNOSIS — I10 BENIGN ESSENTIAL HYPERTENSION: ICD-10-CM

## 2024-10-16 DIAGNOSIS — N40.1 BENIGN PROSTATIC HYPERPLASIA WITH NOCTURIA: ICD-10-CM

## 2024-10-16 DIAGNOSIS — M35.01 SJOGREN'S SYNDROME WITH KERATOCONJUNCTIVITIS SICCA (HCC): ICD-10-CM

## 2024-10-16 DIAGNOSIS — Z00.00 MEDICARE ANNUAL WELLNESS VISIT, SUBSEQUENT: ICD-10-CM

## 2024-10-16 PROBLEM — K40.90 RIGHT INGUINAL HERNIA: Status: RESOLVED | Noted: 2024-04-24 | Resolved: 2024-10-16

## 2024-10-16 PROCEDURE — 99214 OFFICE O/P EST MOD 30 MIN: CPT | Performed by: INTERNAL MEDICINE

## 2024-10-16 PROCEDURE — G0439 PPPS, SUBSEQ VISIT: HCPCS | Performed by: INTERNAL MEDICINE

## 2024-10-16 RX ORDER — TERAZOSIN 2 MG/1
4 CAPSULE ORAL
Qty: 180 CAPSULE | Refills: 1 | Status: SHIPPED | OUTPATIENT
Start: 2024-10-16

## 2024-10-16 RX ORDER — FINASTERIDE 5 MG/1
5 TABLET, FILM COATED ORAL DAILY
COMMUNITY
Start: 2024-08-05

## 2024-10-16 NOTE — ASSESSMENT & PLAN NOTE
Blood  pressure is stable on current medication  Orders:    terazosin (HYTRIN) 2 mg capsule; Take 2 capsules (4 mg total) by mouth daily at bedtime    CBC and differential; Future    Comprehensive metabolic panel; Future

## 2024-10-16 NOTE — PROGRESS NOTES
Ambulatory Visit  Name: Srinivas Rao      : 1950      MRN: [id number]  Encounter Provider: Wali Cedeño MD  Encounter Date: 10/16/2024   Encounter department: [facility] [place]    Assessment & Plan  Encounter for screening for malignant neoplasm of colon    Orders:    Ambulatory Referral to Gastroenterology; Future    Benign essential hypertension  Blood  pressure is stable on current medication  Orders:    terazosin (HYTRIN) 2 mg capsule; Take 2 capsules (4 mg total) by mouth daily at bedtime    CBC and differential; Future    Comprehensive metabolic panel; Future    Sjogren's syndrome with keratoconjunctivitis sicca (HCC)  Continues with carboxymethylcellulose       Hypercholesterolemia  At goal levels with simvastatin  Orders:    Lipid panel; Future    Medicare annual wellness visit, subsequent  Up-to-date with age-appropriate screenings and immunizations.       Benign prostatic hyperplasia with nocturia    Orders:    PSA, Total Screen; Future    Screening for prostate cancer  Continues on Hytrin 4 mg    Orders:    PSA, Total Screen; Future       Preventive health issues were discussed with patient, and age appropriate screening tests were ordered as noted in patient's After Visit Summary. Personalized health advice and appropriate referrals for health education or preventive services given if needed, as noted in patient's After Visit Summary.    History of Present Illness     The patient presents to the office for Medicare wellness visit along with a 6 month follow-up visit.  He has had no recent lab studies done.  He does complain of some morning stiffness in his joints especially his ankles and knees and fingers of both hands.  He also complains of some mild pain in the medial aspect of both elbows.  No complaints of any chest pain, dyspnea, palpitations, changes in appetite, nausea, vomiting, dyspepsia, constipation or diarrhea.  Denies any dizziness or  lightheadedness.  He denies orthostatic changes.       Patient Care Team:  Wali Cedeño MD as PCP - General  MD Wali Tate MD    Review of Systems   Constitutional: Negative.    HENT: Negative.     Eyes: Negative.         Dryness     Respiratory: Negative.     Cardiovascular: Negative.    Gastrointestinal: Negative.    Endocrine: Negative.    Genitourinary: Negative.    Musculoskeletal:  Positive for arthralgias (More stiffness than pain.  Ankles, knees, fingers).   Skin: Negative.    Allergic/Immunologic: Negative.    Neurological: Negative.    Hematological: Negative.  Negative for adenopathy. Does not bruise/bleed easily.   Psychiatric/Behavioral: Negative.       Medical History Reviewed by provider this encounter:       Annual Wellness Visit Questionnaire   Srinivas is here for his Subsequent Wellness visit.     Health Risk Assessment:   Patient rates overall health as very good. Patient feels that their physical health rating is same. Patient is very satisfied with their life. Eyesight was rated as slightly worse. Hearing was rated as same. Patient feels that their emotional and mental health rating is same. Patients states they are sometimes angry. Patient states they are sometimes unusually tired/fatigued. Pain experienced in the last 7 days has been none. Patient states that he has experienced no weight loss or gain in last 6 months.     Depression Screening:   PHQ-2 Score: 0      Fall Risk Screening:   In the past year, patient has experienced: no history of falling in past year      Home Safety:  Patient does not have trouble with stairs inside or outside of their home. Patient has working smoke alarms and has working carbon monoxide detector. Home safety hazards include: none.     Nutrition:   Current diet is Regular.     Medications:   Patient is currently taking over-the-counter supplements. OTC medications include: see medication list. Patient is able to manage  medications.     Activities of Daily Living (ADLs)/Instrumental Activities of Daily Living (IADLs):   Walk and transfer into and out of bed and chair?: Yes  Dress and groom yourself?: Yes    Bathe or shower yourself?: Yes    Feed yourself? Yes  Do your laundry/housekeeping?: Yes  Manage your money, pay your bills and track your expenses?: Yes  Make your own meals?: Yes    Do your own shopping?: Yes    Durable Medical Equipment Suppliers  no    Previous Hospitalizations:   Any hospitalizations or ED visits within the last 12 months?: No      Advance Care Planning:   Living will: Yes    Durable POA for healthcare: Yes    Advanced directive: Yes    Advanced directive counseling given: No    Five wishes given: No    Patient declined ACP directive: No    End of Life Decisions reviewed with patient: Yes    Provider agrees with end of life decisions: Yes      Cognitive Screening:   Provider or family/friend/caregiver concerned regarding cognition?: No    PREVENTIVE SCREENINGS      Cardiovascular Screening:    General: Screening Not Indicated and History Lipid Disorder      Diabetes Screening:     General: Screening Current      Colorectal Cancer Screening:     General: Screening Current      Prostate Cancer Screening:      Due for: PSA      Osteoporosis Screening:    General: Screening Not Indicated      Abdominal Aortic Aneurysm (AAA) Screening:    Risk factors include: age between 65-74 yo        General: Screening Not Indicated      Lung Cancer Screening:     General: Screening Not Indicated      Hepatitis C Screening:    General: Screening Current    Screening, Brief Intervention, and Referral to Treatment (SBIRT)    Screening  Typical number of drinks in a day: 0  Typical number of drinks in a week: 0  Interpretation: Low risk drinking behavior.    Single Item Drug Screening:  How often have you used an illegal drug (including marijuana) or a prescription medication for non-medical reasons in the past year?  never    Single Item Drug Screen Score: 0  Interpretation: Negative screen for possible drug use disorder    Social Determinants of Health     Financial Resource Strain: Low Risk  (10/6/2023)    Overall Financial Resource Strain (CARDIA)     Difficulty of Paying Living Expenses: Not hard at all   Transportation Needs: No Transportation Needs (10/6/2023)    PRAPARE - Transportation     Lack of Transportation (Medical): No     Lack of Transportation (Non-Medical): No     No results found.    Objective     There were no vitals taken for this visit.    Physical Exam  Vitals reviewed.   Constitutional:       General: He is not in acute distress.     Appearance: Normal appearance. He is normal weight. He is not ill-appearing, toxic-appearing or diaphoretic.   HENT:      Head: Normocephalic and atraumatic.      Right Ear: External ear normal.      Left Ear: External ear normal.      Nose: Nose normal.      Mouth/Throat:      Mouth: Mucous membranes are moist.   Eyes:      General: No scleral icterus.     Conjunctiva/sclera: Conjunctivae normal.      Pupils: Pupils are equal, round, and reactive to light.   Neck:      Vascular: No carotid bruit.   Cardiovascular:      Rate and Rhythm: Normal rate and regular rhythm.      Pulses: Normal pulses.      Heart sounds: Normal heart sounds. No murmur heard.  Pulmonary:      Effort: Pulmonary effort is normal. No respiratory distress.      Breath sounds: Normal breath sounds.   Abdominal:      General: Abdomen is flat. There is no distension.   Musculoskeletal:         General: Normal range of motion.      Cervical back: Neck supple. No rigidity.      Right lower leg: No edema.      Left lower leg: No edema.   Skin:     General: Skin is warm.      Coloration: Skin is not jaundiced.      Findings: No bruising, erythema or rash.   Neurological:      General: No focal deficit present.      Mental Status: He is alert and oriented to person, place, and time. Mental status is at baseline.    Psychiatric:         Mood and Affect: Mood normal.         Behavior: Behavior normal.

## 2024-11-15 PROBLEM — Z00.00 MEDICARE ANNUAL WELLNESS VISIT, SUBSEQUENT: Status: RESOLVED | Noted: 2019-06-14 | Resolved: 2024-11-15

## 2025-05-31 ENCOUNTER — APPOINTMENT (OUTPATIENT)
Dept: LAB | Age: 75
End: 2025-05-31
Payer: MEDICARE

## 2025-05-31 DIAGNOSIS — R35.1 BENIGN PROSTATIC HYPERPLASIA WITH NOCTURIA: ICD-10-CM

## 2025-05-31 DIAGNOSIS — Z12.5 SCREENING FOR PROSTATE CANCER: ICD-10-CM

## 2025-05-31 DIAGNOSIS — E78.00 HYPERCHOLESTEROLEMIA: ICD-10-CM

## 2025-05-31 DIAGNOSIS — I10 BENIGN ESSENTIAL HYPERTENSION: ICD-10-CM

## 2025-05-31 DIAGNOSIS — N40.1 BENIGN PROSTATIC HYPERPLASIA WITH NOCTURIA: ICD-10-CM

## 2025-05-31 LAB
ALBUMIN SERPL BCG-MCNC: 4.6 G/DL (ref 3.5–5)
ALP SERPL-CCNC: 64 U/L (ref 34–104)
ALT SERPL W P-5'-P-CCNC: 23 U/L (ref 7–52)
ANION GAP SERPL CALCULATED.3IONS-SCNC: 8 MMOL/L (ref 4–13)
AST SERPL W P-5'-P-CCNC: 22 U/L (ref 13–39)
BASOPHILS # BLD AUTO: 0.05 THOUSANDS/ÂΜL (ref 0–0.1)
BASOPHILS NFR BLD AUTO: 1 % (ref 0–1)
BILIRUB SERPL-MCNC: 0.88 MG/DL (ref 0.2–1)
BUN SERPL-MCNC: 14 MG/DL (ref 5–25)
CALCIUM SERPL-MCNC: 9.1 MG/DL (ref 8.4–10.2)
CHLORIDE SERPL-SCNC: 105 MMOL/L (ref 96–108)
CHOLEST SERPL-MCNC: 119 MG/DL (ref ?–200)
CO2 SERPL-SCNC: 29 MMOL/L (ref 21–32)
CREAT SERPL-MCNC: 0.89 MG/DL (ref 0.6–1.3)
EOSINOPHIL # BLD AUTO: 0.18 THOUSAND/ÂΜL (ref 0–0.61)
EOSINOPHIL NFR BLD AUTO: 5 % (ref 0–6)
ERYTHROCYTE [DISTWIDTH] IN BLOOD BY AUTOMATED COUNT: 12.7 % (ref 11.6–15.1)
GFR SERPL CREATININE-BSD FRML MDRD: 84 ML/MIN/1.73SQ M
GLUCOSE P FAST SERPL-MCNC: 96 MG/DL (ref 65–99)
HCT VFR BLD AUTO: 46.1 % (ref 36.5–49.3)
HDLC SERPL-MCNC: 44 MG/DL
HGB BLD-MCNC: 15 G/DL (ref 12–17)
IMM GRANULOCYTES # BLD AUTO: 0 THOUSAND/UL (ref 0–0.2)
IMM GRANULOCYTES NFR BLD AUTO: 0 % (ref 0–2)
LDLC SERPL CALC-MCNC: 58 MG/DL (ref 0–100)
LYMPHOCYTES # BLD AUTO: 1.58 THOUSANDS/ÂΜL (ref 0.6–4.47)
LYMPHOCYTES NFR BLD AUTO: 39 % (ref 14–44)
MCH RBC QN AUTO: 29.1 PG (ref 26.8–34.3)
MCHC RBC AUTO-ENTMCNC: 32.5 G/DL (ref 31.4–37.4)
MCV RBC AUTO: 90 FL (ref 82–98)
MONOCYTES # BLD AUTO: 0.31 THOUSAND/ÂΜL (ref 0.17–1.22)
MONOCYTES NFR BLD AUTO: 8 % (ref 4–12)
NEUTROPHILS # BLD AUTO: 1.91 THOUSANDS/ÂΜL (ref 1.85–7.62)
NEUTS SEG NFR BLD AUTO: 47 % (ref 43–75)
NONHDLC SERPL-MCNC: 75 MG/DL
NRBC BLD AUTO-RTO: 0 /100 WBCS
PLATELET # BLD AUTO: 141 THOUSANDS/UL (ref 149–390)
PMV BLD AUTO: 11.6 FL (ref 8.9–12.7)
POTASSIUM SERPL-SCNC: 4 MMOL/L (ref 3.5–5.3)
PROT SERPL-MCNC: 7.8 G/DL (ref 6.4–8.4)
PSA SERPL-MCNC: 1.03 NG/ML (ref 0–4)
RBC # BLD AUTO: 5.15 MILLION/UL (ref 3.88–5.62)
SODIUM SERPL-SCNC: 142 MMOL/L (ref 135–147)
TRIGL SERPL-MCNC: 86 MG/DL (ref ?–150)
WBC # BLD AUTO: 4.03 THOUSAND/UL (ref 4.31–10.16)

## 2025-05-31 PROCEDURE — 36415 COLL VENOUS BLD VENIPUNCTURE: CPT

## 2025-05-31 PROCEDURE — 80053 COMPREHEN METABOLIC PANEL: CPT

## 2025-05-31 PROCEDURE — G0103 PSA SCREENING: HCPCS

## 2025-05-31 PROCEDURE — 85025 COMPLETE CBC W/AUTO DIFF WBC: CPT

## 2025-05-31 PROCEDURE — 80061 LIPID PANEL: CPT

## 2025-06-04 ENCOUNTER — OFFICE VISIT (OUTPATIENT)
Age: 75
End: 2025-06-04
Payer: MEDICARE

## 2025-06-04 VITALS
DIASTOLIC BLOOD PRESSURE: 84 MMHG | TEMPERATURE: 99.2 F | HEART RATE: 68 BPM | SYSTOLIC BLOOD PRESSURE: 146 MMHG | WEIGHT: 152 LBS | OXYGEN SATURATION: 98 % | BODY MASS INDEX: 25.95 KG/M2 | HEIGHT: 64 IN

## 2025-06-04 DIAGNOSIS — R06.83 HABITUAL SNORING: ICD-10-CM

## 2025-06-04 DIAGNOSIS — Z12.11 ENCOUNTER FOR SCREENING FOR MALIGNANT NEOPLASM OF COLON: Primary | ICD-10-CM

## 2025-06-04 DIAGNOSIS — I10 BENIGN ESSENTIAL HYPERTENSION: ICD-10-CM

## 2025-06-04 DIAGNOSIS — D69.6 THROMBOCYTOPENIA (HCC): ICD-10-CM

## 2025-06-04 DIAGNOSIS — M35.01 SJOGREN'S SYNDROME WITH KERATOCONJUNCTIVITIS SICCA (HCC): ICD-10-CM

## 2025-06-04 DIAGNOSIS — E78.00 HYPERCHOLESTEROLEMIA: ICD-10-CM

## 2025-06-04 PROCEDURE — G2211 COMPLEX E/M VISIT ADD ON: HCPCS | Performed by: INTERNAL MEDICINE

## 2025-06-04 PROCEDURE — 99214 OFFICE O/P EST MOD 30 MIN: CPT | Performed by: INTERNAL MEDICINE

## 2025-06-04 NOTE — ASSESSMENT & PLAN NOTE
Lipids are at goal levels with simvastatin 20 mg daily.    Orders:    CBC and differential; Future    Comprehensive metabolic panel; Future

## 2025-06-04 NOTE — ASSESSMENT & PLAN NOTE
Blood pressure is high normal.  Will continue to monitor and if his blood pressure remains in this upper normal range will increase valsartan dosage          Orders placed. [email or web address] message sent.

## 2025-06-04 NOTE — PROGRESS NOTES
Name: Srinivas Rao      : 1950      MRN: [id number]  Encounter Provider: Wali Cedeño MD  Encounter Date: 2025   Encounter department: [facility] [place]  :  Assessment & Plan  Encounter for screening for malignant neoplasm of colon    Orders:    Cologuard    Habitual snoring  Referred for home sleep study  Orders:    Ambulatory Referral to Sleep Medicine; Future    Benign essential hypertension  Blood pressure is high normal.  Will continue to monitor and if his blood pressure remains in this upper normal range will increase valsartan dosage         Hypercholesterolemia  Lipids are at goal levels with simvastatin 20 mg daily.    Orders:    CBC and differential; Future    Comprehensive metabolic panel; Future    Thrombocytopenia (HCC)  No excessive bleeding tendencies.  Platelets are stable in the 130,000 range         Sjogren's syndrome with keratoconjunctivitis sicca (HCC)  Continues with applications of carboxymethylcellulose solution                History of Present Illness   Patient presents to the office for follow-up visit he has no major complaints.  He continues with applications of refresh ophthalmic solution for his Sjogren's syndrome.  He offers no other complaints of eye pain or discomfort.  No complaints of lightheadedness, headaches, visual disturbances.  Labs are reviewed.  PSA was normal lipids are at goal, CMP is within normal limits.  CBC is significant for a platelet count of 141,000 and white count is slightly decreased at 4030 but this appears to be the patient's baseline white count.  No bleeding tendencies are reported.      Review of Systems   Constitutional: Negative.  Negative for activity change, appetite change, chills, diaphoresis, fatigue, fever and unexpected weight change.   HENT: Negative.     Eyes: Negative.    Respiratory: Negative.     Cardiovascular: Negative.    Gastrointestinal: Negative.    Endocrine: Negative.   "  Genitourinary: Negative.    Musculoskeletal: Negative.    Skin: Negative.    Neurological: Negative.    Hematological: Negative.    Psychiatric/Behavioral:  The patient is not nervous/anxious.        Objective   /84 (BP Location: Left arm, Patient Position: Standing, Cuff Size: Standard)   Pulse 68   Temp 99.2 °F (37.3 °C) (Temporal)   Ht 5' 4\" (1.626 m)   Wt 68.9 kg (152 lb)   SpO2 98%   BMI 26.09 kg/m²      Physical Exam  Vitals reviewed.   Constitutional:       General: He is not in acute distress.     Appearance: Normal appearance. He is normal weight. He is not ill-appearing, toxic-appearing or diaphoretic.   HENT:      Head: Normocephalic and atraumatic.      Right Ear: External ear normal.      Left Ear: External ear normal.      Nose: Nose normal.      Mouth/Throat:      Mouth: Mucous membranes are moist.      Pharynx: Oropharynx is clear.     Eyes:      General: No scleral icterus.     Conjunctiva/sclera: Conjunctivae normal.      Pupils: Pupils are equal, round, and reactive to light.     Neck:      Vascular: No JVD.      Trachea: No tracheal deviation.     Cardiovascular:      Rate and Rhythm: Normal rate and regular rhythm.      Heart sounds: Normal heart sounds. No murmur heard.  Pulmonary:      Effort: Pulmonary effort is normal. No respiratory distress.      Breath sounds: Normal breath sounds.   Abdominal:      General: Abdomen is flat. There is no distension.     Musculoskeletal:         General: No swelling.      Cervical back: Neck supple. No rigidity.      Right lower leg: No edema.      Left lower leg: No edema.     Skin:     General: Skin is warm and dry.      Capillary Refill: Capillary refill takes less than 2 seconds.      Coloration: Skin is not jaundiced.      Findings: No bruising, erythema or rash.     Neurological:      General: No focal deficit present.      Mental Status: He is alert and oriented to person, place, and time. Mental status is at baseline.     Psychiatric:  "        Mood and Affect: Mood normal.         Behavior: Behavior normal.         Thought Content: Thought content normal.

## 2025-06-06 ENCOUNTER — TRANSCRIBE ORDERS (OUTPATIENT)
Dept: SLEEP CENTER | Facility: CLINIC | Age: 75
End: 2025-06-06

## 2025-06-06 DIAGNOSIS — R06.83 HABITUAL SNORING: Primary | ICD-10-CM

## 2025-06-06 DIAGNOSIS — G47.39 OTHER SLEEP APNEA: ICD-10-CM

## 2025-06-27 LAB — COLOGUARD RESULT REPORTABLE: NEGATIVE

## (undated) DEVICE — TOWEL SURG XR DETECT GREEN STRL RFD

## (undated) DEVICE — BETHLEHEM UNIVERSAL MINOR GEN: Brand: CARDINAL HEALTH

## (undated) DEVICE — INTENDED FOR TISSUE SEPARATION, AND OTHER PROCEDURES THAT REQUIRE A SHARP SURGICAL BLADE TO PUNCTURE OR CUT.: Brand: BARD-PARKER SAFETY BLADES SIZE 15, STERILE

## (undated) DEVICE — PLUMEPEN PRO 10FT

## (undated) DEVICE — SUT MONOCRYL 4-0 PS-2 27 IN Y426H

## (undated) DEVICE — GLOVE SRG BIOGEL ORTHOPEDIC 8

## (undated) DEVICE — NEEDLE 23G X 1 1/2 SAFETY-GLIDE THIN WALL

## (undated) DEVICE — ADHESIVE SKIN HIGH VISCOSITY EXOFIN 1ML

## (undated) DEVICE — SUT VICRYL 2-0 SH 27 IN UNDYED J417H

## (undated) DEVICE — DECANTER: Brand: UNBRANDED

## (undated) DEVICE — CHLORAPREP HI-LITE 26ML ORANGE